# Patient Record
Sex: MALE | Race: WHITE | Employment: OTHER | URBAN - METROPOLITAN AREA
[De-identification: names, ages, dates, MRNs, and addresses within clinical notes are randomized per-mention and may not be internally consistent; named-entity substitution may affect disease eponyms.]

---

## 2017-01-11 DIAGNOSIS — E11.9 TYPE 2 DIABETES MELLITUS WITHOUT COMPLICATION, UNSPECIFIED LONG TERM INSULIN USE STATUS: Primary | ICD-10-CM

## 2017-01-11 NOTE — TELEPHONE ENCOUNTER
glimepiride (AMARYL) 1 MG tablet         Last Written Prescription Date: 10/18/16  Last Fill Quantity: 90, # refills: 0  Last Office Visit with G, P or [facility] prescribing provider:  11/25/16        BP Readings from Last 3 Encounters:   11/25/16 124/73   06/28/16 112/69   06/06/16 113/72     MICROL       32   6/6/2016  No results found for this basename: microalbumin  CREATININE   Date Value Ref Range Status   06/06/2016 1.09 0.66 - 1.25 mg/dL Final   ]  GFR ESTIMATE   Date Value Ref Range Status   06/06/2016 68 >60 mL/min/1.7m2 Final     Comment:     Non  GFR Calc   05/06/2016 71.9 ml/min/1.73m2 Final   08/14/2015 71.9 ml/min/1.73m2 Final     GFR ESTIMATE IF BLACK   Date Value Ref Range Status   06/06/2016 82 >60 mL/min/1.7m2 Final     Comment:      GFR Calc   10/28/2014 83 >60 mL/min/1.7m2 Final     Comment:      GFR Calc   10/26/2009 76 >60 mL/min/1.7m2 Final     CHOL      186   6/6/2016  HDL       43   6/6/2016  LDL       83   6/6/2016  LDL       96   7/2/2013  TRIG      301   6/6/2016  CHOLHDLRATIO      4.0   5/11/2015  AST       15   5/6/2016  ALT       36   5/6/2016  A1C      7.6   11/25/2016  A1C      7.3   6/6/2016  A1C      7.2   11/30/2015  A1C      7.1   5/11/2015  A1C      7.1   10/28/2014  POTASSIUM   Date Value Ref Range Status   06/06/2016 5.4* 3.4 - 5.3 mmol/L Final       [facility]

## 2017-01-12 RX ORDER — GLIMEPIRIDE 1 MG/1
1 TABLET ORAL EVERY MORNING
Qty: 90 TABLET | Refills: 3 | Status: SHIPPED | OUTPATIENT
Start: 2017-01-12 | End: 2017-12-27

## 2017-01-12 NOTE — TELEPHONE ENCOUNTER
Routing refill request to provider for review/approval because:  Drug warning.   Ilene Major RN

## 2017-05-02 ENCOUNTER — TRANSFERRED RECORDS (OUTPATIENT)
Dept: HEALTH INFORMATION MANAGEMENT | Facility: CLINIC | Age: 67
End: 2017-05-02

## 2017-05-02 LAB
ALT SERPL-CCNC: 30 IU/L (ref 5–40)
AST SERPL-CCNC: 11 U/L (ref 5–34)
CREAT SERPL-MCNC: 1.08 MG/DL (ref 0.5–1.3)
GFR SERPL CREATININE-BSD FRML MDRD: 73.2 ML/MIN/1.73M2

## 2017-05-19 DIAGNOSIS — J31.0 CHRONIC RHINITIS: ICD-10-CM

## 2017-05-19 NOTE — TELEPHONE ENCOUNTER
fluticasone (FLONASE) 50 MCG/ACT nasal spray       Last Written Prescription Date: 11/25/16  Last Fill Quantity: 16 ml, # refills: 3    Last Office Visit with G, [facility] or [facility] prescribing provider:  11/25/16   Future Office Visit:       Date of Last Asthma Action Plan Letter:   There are no preventive care reminders to display for this patient.   Asthma Control Test: No flowsheet data found.    Date of Last Spirometry Test:   No results found for this or any previous visit.          Raheem Faarax  [facility]

## 2017-05-22 RX ORDER — FLUTICASONE PROPIONATE 50 MCG
SPRAY, SUSPENSION (ML) NASAL
Qty: 16 ML | Refills: 1 | Status: SHIPPED | OUTPATIENT
Start: 2017-05-22 | End: 2017-08-21

## 2017-05-22 NOTE — TELEPHONE ENCOUNTER
Prescription approved per [facility] Refill Protocol.  Annabella Kauffman RN

## 2017-06-12 ENCOUNTER — OFFICE VISIT (OUTPATIENT)
Dept: FAMILY MEDICINE | Facility: CLINIC | Age: 67
End: 2017-06-12
Payer: COMMERCIAL

## 2017-06-12 VITALS
TEMPERATURE: 98.3 F | DIASTOLIC BLOOD PRESSURE: 71 MMHG | BODY MASS INDEX: 27.57 KG/M2 | HEIGHT: 73 IN | WEIGHT: 208 LBS | HEART RATE: 80 BPM | OXYGEN SATURATION: 97 % | SYSTOLIC BLOOD PRESSURE: 106 MMHG

## 2017-06-12 DIAGNOSIS — I10 ESSENTIAL HYPERTENSION, BENIGN: ICD-10-CM

## 2017-06-12 DIAGNOSIS — I10 HYPERTENSION, BENIGN ESSENTIAL, GOAL BELOW 140/90: ICD-10-CM

## 2017-06-12 DIAGNOSIS — M05.79 RHEUMATOID ARTHRITIS INVOLVING MULTIPLE SITES WITH POSITIVE RHEUMATOID FACTOR (H): ICD-10-CM

## 2017-06-12 DIAGNOSIS — E11.9 TYPE 2 DIABETES MELLITUS WITHOUT COMPLICATION, WITH LONG-TERM CURRENT USE OF INSULIN (H): Primary | ICD-10-CM

## 2017-06-12 DIAGNOSIS — Z79.4 TYPE 2 DIABETES MELLITUS WITHOUT COMPLICATION, WITH LONG-TERM CURRENT USE OF INSULIN (H): Primary | ICD-10-CM

## 2017-06-12 DIAGNOSIS — Z23 NEED FOR PROPHYLACTIC VACCINATION WITH TETANUS-DIPHTHERIA (TD): ICD-10-CM

## 2017-06-12 DIAGNOSIS — E78.5 HYPERLIPIDEMIA LDL GOAL <100: ICD-10-CM

## 2017-06-12 LAB
ALBUMIN SERPL-MCNC: 4.1 G/DL (ref 3.4–5)
ANION GAP SERPL CALCULATED.3IONS-SCNC: 11 MMOL/L (ref 3–14)
BUN SERPL-MCNC: 39 MG/DL (ref 7–30)
CALCIUM SERPL-MCNC: 9.5 MG/DL (ref 8.5–10.1)
CHLORIDE SERPL-SCNC: 105 MMOL/L (ref 94–109)
CHOLEST SERPL-MCNC: 195 MG/DL
CO2 SERPL-SCNC: 20 MMOL/L (ref 20–32)
CREAT SERPL-MCNC: 1.32 MG/DL (ref 0.66–1.25)
CREAT UR-MCNC: 88 MG/DL
ERYTHROCYTE [DISTWIDTH] IN BLOOD BY AUTOMATED COUNT: 14.4 % (ref 10–15)
GFR SERPL CREATININE-BSD FRML MDRD: 54 ML/MIN/1.7M2
GLUCOSE SERPL-MCNC: 131 MG/DL (ref 70–99)
HBA1C MFR BLD: 6 % (ref 4.3–6)
HCT VFR BLD AUTO: 43.5 % (ref 40–53)
HDLC SERPL-MCNC: 49 MG/DL
HGB BLD-MCNC: 15 G/DL (ref 13.3–17.7)
LDLC SERPL CALC-MCNC: 114 MG/DL
MCH RBC QN AUTO: 32.3 PG (ref 26.5–33)
MCHC RBC AUTO-ENTMCNC: 34.5 G/DL (ref 31.5–36.5)
MCV RBC AUTO: 94 FL (ref 78–100)
MICROALBUMIN UR-MCNC: 8 MG/L
MICROALBUMIN/CREAT UR: 8.76 MG/G CR (ref 0–17)
NONHDLC SERPL-MCNC: 146 MG/DL
PHOSPHATE SERPL-MCNC: 3.8 MG/DL (ref 2.5–4.5)
PLATELET # BLD AUTO: 223 10E9/L (ref 150–450)
POTASSIUM SERPL-SCNC: 5.8 MMOL/L (ref 3.4–5.3)
RBC # BLD AUTO: 4.64 10E12/L (ref 4.4–5.9)
SODIUM SERPL-SCNC: 136 MMOL/L (ref 133–144)
TRIGL SERPL-MCNC: 161 MG/DL
WBC # BLD AUTO: 8 10E9/L (ref 4–11)

## 2017-06-12 PROCEDURE — 83036 HEMOGLOBIN GLYCOSYLATED A1C: CPT | Performed by: FAMILY MEDICINE

## 2017-06-12 PROCEDURE — 80061 LIPID PANEL: CPT | Performed by: FAMILY MEDICINE

## 2017-06-12 PROCEDURE — 85027 COMPLETE CBC AUTOMATED: CPT | Performed by: FAMILY MEDICINE

## 2017-06-12 PROCEDURE — 36415 COLL VENOUS BLD VENIPUNCTURE: CPT | Performed by: FAMILY MEDICINE

## 2017-06-12 PROCEDURE — 99214 OFFICE O/P EST MOD 30 MIN: CPT | Mod: 25 | Performed by: FAMILY MEDICINE

## 2017-06-12 PROCEDURE — 90715 TDAP VACCINE 7 YRS/> IM: CPT | Performed by: FAMILY MEDICINE

## 2017-06-12 PROCEDURE — 90471 IMMUNIZATION ADMIN: CPT | Performed by: FAMILY MEDICINE

## 2017-06-12 PROCEDURE — 82043 UR ALBUMIN QUANTITATIVE: CPT | Performed by: FAMILY MEDICINE

## 2017-06-12 PROCEDURE — 80069 RENAL FUNCTION PANEL: CPT | Performed by: FAMILY MEDICINE

## 2017-06-12 RX ORDER — LISINOPRIL 10 MG/1
10 TABLET ORAL DAILY
Qty: 90 TABLET | Refills: 3 | Status: SHIPPED | OUTPATIENT
Start: 2017-06-12

## 2017-06-12 ASSESSMENT — PAIN SCALES - GENERAL: PAINLEVEL: NO PAIN (1)

## 2017-06-12 NOTE — NURSING NOTE
"Chief Complaint   Patient presents with     Diabetes     Fasting       Initial /71 (BP Location: Right arm, Patient Position: Chair, Cuff Size: Adult Regular)  Pulse 80  Temp 98.3  F (36.8  C) (Oral)  Ht 6' 0.5\" (1.842 m)  Wt 208 lb (94.3 kg)  SpO2 97%  BMI 27.82 kg/m2 Estimated body mass index is 27.82 kg/(m^2) as calculated from the following:    Height as of this encounter: 6' 0.5\" (1.842 m).    Weight as of this encounter: 208 lb (94.3 kg).  Medication Reconciliation: complete     Brad Varner CMA    "

## 2017-06-12 NOTE — PROGRESS NOTES
SUBJECTIVE:                                                    Michael Stallings is a 66 year old male who presents to clinic today for the following health issues:      Diabetes Follow-up    Patient is checking blood sugars: twice daily. 111-120, cut way back on his carbohydrates to 50-60 a day and is losing weight. Decreased the insulin also because of low blood sugar.  Blood sugar testing frequency justification: Uncontrolled diabetes  Results are as follows:         am - this morning 111. Range around     Diabetic concerns: None     Symptoms of hypoglycemia (low blood sugar): none     Paresthesias (numbness or burning in feet) or sores: Unsure due to arthritis     Date of last diabetic eye exam: UTD     Hyperlipidemia Follow-Up      Rate your low fat/cholesterol diet?: fair    Taking statin?  No    Other lipid medications/supplements?:  Fish oil/Omega 3, 1 pill  twice a day without side effects     Hypertension Follow-up      Outpatient blood pressures are not being checked.    Low Salt Diet: no added salt         Amount of exercise or physical activity: 6-7 days/week for an average of 45-60 minutes    Problems taking medications regularly: No    Medication side effects: none    Diet: diabetic      Rheumatoid arthritis well controlled.     Problem list and histories reviewed & adjusted, as indicated.  Additional history: as documented    Patient Active Problem List   Diagnosis     Hyperlipidemia LDL goal <100     RA (rheumatoid arthritis) (H)     Advanced directives, counseling/discussion     Seasonal allergic rhinitis     Type 2 diabetes mellitus without complication (H)     Overweight (BMI 25.0-29.9)     Non morbid obesity due to excess calories     Hypertension, benign essential, goal below 140/90     Past Surgical History:   Procedure Laterality Date     ABDOMEN SURGERY  4/2010    small bowel tumor leiomyoma, benign     KNEE SURGERY  19 years of age     TONSILLECTOMY  childhood     VASECTOMY  1991        Social History   Substance Use Topics     Smoking status: Former Smoker     Quit date: 3/21/1990     Smokeless tobacco: Never Used     Alcohol use Yes      Comment: 2 wkly     Family History   Problem Relation Age of Onset     Breast Cancer Mother      CANCER Paternal Grandfather 60     Lung Cancer     DIABETES Daughter          Current Outpatient Prescriptions   Medication Sig Dispense Refill     fluticasone (FLONASE) 50 MCG/ACT spray SHAKE LIQUID AND USE 1 TO 2 SPRAYS IN EACH NOSTRIL DAILY 16 mL 1     glimepiride (AMARYL) 1 MG tablet Take 1 tablet (1 mg) by mouth every morning 90 tablet 3     simvastatin (ZOCOR) 40 MG tablet Take 1 tablet (40 mg) by mouth daily 90 tablet 3     metFORMIN (GLUCOPHAGE) 1000 MG tablet Take 1 tablet (1,000 mg) by mouth 2 times daily (with meals) 180 tablet 3     lisinopril (PRINIVIL,ZESTRIL) 20 MG tablet Take 1 tablet (20 mg) by mouth daily 90 tablet 3     insulin glargine (LANTUS SOLOSTAR) 100 UNIT/ML PEN Inject 50 Units Subcutaneous At Bedtime (Patient taking differently: Inject 40 Units Subcutaneous At Bedtime ) 45 mL 3     BD FATOU U/F 32G X 4 MM insulin pen needle Inject Subcutaneous 2 times daily Use 2 pen needles daily or as directed. 200 each 3     aspirin 81 MG tablet Take 1 tablet (81 mg) by mouth daily 100 tablet 3     albuterol (PROAIR HFA, PROVENTIL HFA, VENTOLIN HFA) 108 (90 BASE) MCG/ACT inhaler Inhale 2 puffs into the lungs every 6 hours as needed for shortness of breath / dyspnea 1 Inhaler 6     blood glucose monitoring (ACCU-CHEK CATE PLUS) test strip 1 strip by In Vitro route 2 times daily Use to test blood sugar 2 times daily or as directed. 200 each 3     BD ULTRA FINE PEN NEEDLES Inject 4 mm as directed daily 4 mm x 32G 100 Device 3     fish oil-omega-3 fatty acids (FISH OIL) 1000 MG capsule Take 1 g by mouth 2 times daily.       methotrexate, Anti-Rheumatic, 2.5 MG TABS Take 8 tablets by mouth once a week.       folic acid (FOLVITE) 1 MG tablet Take 1 mg by  "mouth daily.       Allergies   Allergen Reactions     Penicillin G      Family history     Sulfa Drugs      Recent Labs   Lab Test 05/02/17 11/25/16   1649  06/06/16   1139 05/06/16  11/30/15   0926 08/14/15  05/11/15   0848   10/28/14   1009   05/05/14   0827  11/04/13   0916   A1C   --   7.6*  7.3*   --   7.2*   --   7.1*   --   7.1*   --   6.7*  6.8*   LDL   --    --   83   --    --    --   69   --    --    --   81  113   HDL   --    --   43   --    --    --   41   --    --    --   44  45   TRIG   --    --   301*   --    --    --   268*   --    --    --   234*  156*   ALT  30   --    --   36   --   45*   --    < >   --    < >   --    --    CR  1.080   --   1.09  1.1   --   1.1   --    < >  1.09   < >   --    --    GFRESTIMATED  73.2   --   68  71.9   --   71.9   --    --   68   < >   --    --    GFRESTBLACK   --    --   82   --    --    --    --    --   83   --    --    --    POTASSIUM   --    --   5.4*   --    --    --    --    --   4.8   --    --    --    TSH   --    --   1.46   --    --    --    --    --    --    --    --   1.67    < > = values in this interval not displayed.      BP Readings from Last 3 Encounters:   06/12/17 106/71   11/25/16 124/73   06/28/16 112/69    Wt Readings from Last 3 Encounters:   06/12/17 208 lb (94.3 kg)   11/25/16 219 lb (99.3 kg)   06/28/16 218 lb 9.6 oz (99.2 kg)                  Labs reviewed in EPIC    Reviewed and updated as needed this visit by clinical staff       Reviewed and updated as needed this visit by Provider         ROS:  Constitutional, HEENT, cardiovascular, pulmonary, gi and gu systems are negative, except as otherwise noted.    OBJECTIVE:                                                    /71 (BP Location: Right arm, Patient Position: Chair, Cuff Size: Adult Regular)  Pulse 80  Temp 98.3  F (36.8  C) (Oral)  Ht 6' 0.5\" (1.842 m)  Wt 208 lb (94.3 kg)  SpO2 97%  BMI 27.82 kg/m2  Body mass index is 27.82 kg/(m^2).  GENERAL: healthy, alert and no " "distress, is not obese  EYES: Eyes grossly normal to inspection, PERRL and conjunctivae and sclerae normal  HENT: ear canals and TM's normal, nose and mouth without ulcers or lesions  NECK: no adenopathy, no asymmetry, masses, or scars and thyroid normal to palpation  RESP: lungs clear to auscultation - no rales, rhonchi or wheezes  CV: regular rate and rhythm, normal S1 S2, no S3 or S4, no murmur, click or rub, no peripheral edema and peripheral pulses strong  ABDOMEN: soft, nontender, no hepatosplenomegaly, no masses and bowel sounds normal  MS: no gross musculoskeletal defects noted, no edema  SKIN: no suspicious lesions or rashes  NEURO: Normal strength and tone, mentation intact and speech normal  BACK: no CVA tenderness, no paralumbar tenderness  PSYCH: mentation appears normal, affect normal/bright  Diabetic foot exam: normal DP and PT pulses, no trophic changes or ulcerative lesions, normal calluses, normal sensory exam and normal monofilament exam     Diagnostic Test Results:  Results for orders placed or performed in visit on 06/12/17 (from the past 24 hour(s))   HEMOGLOBIN A1C   Result Value Ref Range    Hemoglobin A1C 6.0 4.3 - 6.0 %   CBC with platelets   Result Value Ref Range    WBC 8.0 4.0 - 11.0 10e9/L    RBC Count 4.64 4.4 - 5.9 10e12/L    Hemoglobin 15.0 13.3 - 17.7 g/dL    Hematocrit 43.5 40.0 - 53.0 %    MCV 94 78 - 100 fl    MCH 32.3 26.5 - 33.0 pg    MCHC 34.5 31.5 - 36.5 g/dL    RDW 14.4 10.0 - 15.0 %    Platelet Count 223 150 - 450 10e9/L        ASSESSMENT/PLAN:                                                        Tobacco Cessation:   reports that he quit smoking about 27 years ago. He has never used smokeless tobacco.      BMI:   Estimated body mass index is 27.82 kg/(m^2) as calculated from the following:    Height as of this encounter: 6' 0.5\" (1.842 m).    Weight as of this encounter: 208 lb (94.3 kg).   Weight management plan: Discussed healthy diet and exercise guidelines and patient " will follow up in 3 months in clinic to re-evaluate.        ICD-10-CM    1. Type 2 diabetes mellitus without complication, with long-term current use of insulin (H)- excellent control with dietary changes E11.9 HEMOGLOBIN A1C    Z79.4 Albumin Random Urine Quantitative     insulin glargine (LANTUS SOLOSTAR) 100 UNIT/ML injection- 40 units a day   2. Hyperlipidemia LDL goal <100 E78.5 Lipid panel reflex to direct LDL   3. Rheumatoid arthritis involving multiple sites with positive rheumatoid factor (H) M05.79 Managed with methotrexate and rheumatology   4. Hypertension, benign essential, goal below 140/90 I10 Renal panel- blood pressure low today and will decrease lisinopril to 10 mg     CBC with platelets   5. Need for prophylactic vaccination with tetanus-diphtheria (TD) Z23      ADMIN VACCINE, FIRST     TDAP VACCINE (ADACEL)   6. Essential hypertension, benign I10 lisinopril (PRINIVIL/ZESTRIL) 10 MG tablet       FUTURE LABS:       - Schedule fasting labs in 6 months  FUTURE APPOINTMENTS:       - Follow-up visit in 6 months or sooner if any questions or concerns.   Work on weight loss  Regular exercise  See Patient Instructions    Janet Can MD  [facility]

## 2017-06-12 NOTE — MR AVS SNAPSHOT
After Visit Summary   6/12/2017    Michael Stallings    MRN: [id number]           Patient Information     Date Of Birth          1950        Visit Information        Provider Department      6/12/2017 9:00 AM Janet Can MD [facility]        Today's Diagnoses     Type 2 diabetes mellitus without complication, with long-term current use of insulin (H)    -  1    Hyperlipidemia LDL goal <100        Rheumatoid arthritis involving multiple sites with positive rheumatoid factor (H)        Hypertension, benign essential, goal below 140/90        Need for prophylactic vaccination with tetanus-diphtheria (TD)        Essential hypertension, benign          Care Instructions    How to contact your care team: [phone] Pharmacy [phone]   RAZ ANTON MD KATYA GEORGIEV, PA-C CHRIS JONES, PA-C NAM HO, MD JONATHAN BATES, MD ARVIN VOCAL, MD    Clinic hours M-Th 7am-7pm Fri 7am-5pm.   Urgent care M-F 11am-9pm  Sat/Sun 9am-5pm.   Pharmacy   Mon-Th:  8:00am-8pm   Fri:  8:00am-6:00pm  Sat/Sun  8:00am-5:00 pm               Follow-ups after your visit        Follow-up notes from your care team     Return in about 3 months (around 9/12/2017) for Physical Exam, Lab Work, medication follow up.      Who to contact     If you have questions or need follow up information about today's clinic visit or your schedule please contact [facility] directly at [phone].  Normal or non-critical lab and imaging results will be communicated to you by MyChart, letter or phone within 4 business days after the clinic has received the results. If you do not hear from us within 7 days, please contact the clinic through MyChart or phone. If you have a critical or abnormal lab result, we will notify you by phone as soon as possible.  Submit refill requests through [employer] or call your pharmacy and they will forward the refill request to us. Please  "allow 3 business days for your refill to be completed.          Additional Information About Your Visit        MyChart Information     [employer] gives you secure access to your electronic health record. If you see a primary care provider, you can also send messages to your care team and make appointments. If you have questions, please call your primary care clinic.  If you do not have a primary care provider, please call [phone] and they will assist you.        Care EveryWhere ID     This is your Care EveryWhere ID. This could be used by other organizations to access your [facility] medical records  [id number]        Your Vitals Were     Pulse Temperature Height Pulse Oximetry BMI (Body Mass Index)       80 98.3  F (36.8  C) (Oral) 6' 0.5\" (1.842 m) 97% 27.82 kg/m2        Blood Pressure from Last 3 Encounters:   06/12/17 106/71   11/25/16 124/73   06/28/16 112/69    Weight from Last 3 Encounters:   06/12/17 208 lb (94.3 kg)   11/25/16 219 lb (99.3 kg)   06/28/16 218 lb 9.6 oz (99.2 kg)              We Performed the Following          ADMIN VACCINE, FIRST     Albumin Random Urine Quantitative     CBC with platelets     HEMOGLOBIN A1C     Lipid panel reflex to direct LDL     Renal panel     TDAP VACCINE (ADACEL)          Today's Medication Changes          These changes are accurate as of: 6/12/17  2:52 PM.  If you have any questions, ask your nurse or doctor.               These medicines have changed or have updated prescriptions.        Dose/Directions    lisinopril 10 MG tablet   Commonly known as:  PRINIVIL/ZESTRIL   This may have changed:    - medication strength  - how much to take   Used for:  Essential hypertension, benign   Changed by:  Janet Can MD        Dose:  10 mg   Take 1 tablet (10 mg) by mouth daily   Quantity:  90 tablet   Refills:  3            Where to get your medicines      These medications were sent to [facility] [address] [place] [address] - [address] AT [facility] of " [address] [place] [address]     Phone:  [phone]     insulin glargine 100 UNIT/ML injection    lisinopril 10 MG tablet                Primary Care Provider Office Phone # Fax #    Janet Dottie Can [phone]       [facility] [address]  [facility] [address]        Thank you!     Thank you for choosing [facility]  for your care. Our goal is always to provide you with excellent care. Hearing back from our patients is one way we can continue to improve our services. Please take a few minutes to complete the written survey that you may receive in the mail after your visit with us. Thank you!             Your Updated Medication List - Protect others around you: Learn how to safely use, store and throw away your medicines at www.disposemymeds.org.          This list is accurate as of: 6/12/17  2:52 PM.  Always use your most recent med list.                   Brand Name Dispense Instructions for use    albuterol 108 (90 BASE) MCG/ACT Inhaler    PROAIR HFA/PROVENTIL HFA/VENTOLIN HFA    1 Inhaler    Inhale 2 puffs into the lungs every 6 hours as needed for shortness of breath / dyspnea       aspirin 81 MG tablet     100 tablet    Take 1 tablet (81 mg) by mouth daily       BD FATOU U/F 32G X 4 MM   Generic drug:  insulin pen needle     200 each    Inject Subcutaneous 2 times daily Use 2 pen needles daily or as directed.       BD ULTRA FINE PEN NEEDLES     100 Device    Inject 4 mm as directed daily 4 mm x 32G       blood glucose monitoring test strip    ACCU-CHEK CATE PLUS    200 each    1 strip by In Vitro route 2 times daily Use to test blood sugar 2 times daily or as directed.       fish oil-omega-3 fatty acids 1000 MG capsule      Take 1 g by mouth 2 times daily.       fluticasone 50 MCG/ACT spray    FLONASE    16 mL    SHAKE LIQUID AND USE 1 TO 2 SPRAYS IN EACH NOSTRIL DAILY       folic acid 1 MG tablet    FOLVITE      Take 1 mg by mouth daily.       glimepiride 1 MG tablet    AMARYL    90 tablet    Take 1 tablet (1 mg) by mouth every morning       insulin glargine 100 UNIT/ML injection    LANTUS SOLOSTAR    36 mL    Inject 40 Units Subcutaneous At Bedtime       lisinopril 10 MG tablet    PRINIVIL/ZESTRIL    90 tablet    Take 1 tablet (10 mg) by mouth daily       metFORMIN 1000 MG tablet    GLUCOPHAGE    180 tablet    Take 1 tablet (1,000 mg) by mouth 2 times daily (with meals)       methotrexate (Anti-Rheumatic) 2.5 MG Tabs      Take 8 tablets by mouth once a week.       simvastatin 40 MG tablet    ZOCOR    90 tablet    Take 1 tablet (40 mg) by mouth daily

## 2017-06-12 NOTE — NURSING NOTE
Screening Questionnaire for Adult Immunization    Are you sick today?   No   Do you have allergies to medications, food, a vaccine component or latex?   No   Have you ever had a serious reaction after receiving a vaccination?   No   Do you have a long-term health problem with heart disease, lung disease, asthma, kidney disease, metabolic disease (e.g. diabetes), anemia, or other blood disorder?   No   Do you have cancer, leukemia, HIV/AIDS, or any other immune system problem?   No   In the past 3 months, have you taken medications that affect  your immune system, such as prednisone, other steroids, or anticancer drugs; drugs for the treatment of rheumatoid arthritis, Crohn s disease, or psoriasis; or have you had radiation treatments?   No   Have you had a seizure, or a brain or other nervous system problem?   No   During the past year, have you received a transfusion of blood or blood     products, or been given immune (gamma) globulin or antiviral drug?   No   For women: Are you pregnant or is there a chance you could become        pregnant during the next month?   No   Have you received any vaccinations in the past 4 weeks?   No     Immunization questionnaire answers were all negative.      MNVFC doesn't apply on this patient     Screening performed by Arvind Salinas on 6/12/2017 at 10:15 AM.

## 2017-06-12 NOTE — PATIENT INSTRUCTIONS
How to contact your care team: [phone] Pharmacy [phone]   RAZ ANTON MD KATYA GEORGIEV, PA-C CHRIS JONES, PA-C NAM HO, MD JONATHAN BATES, MD ARVIN VOCAL, MD    Clinic hours M-Th 7am-7pm Fri 7am-5pm.   Urgent care M-F 11am-9pm  Sat/Sun 9am-5pm.   Pharmacy   Mon-Th:  8:00am-8pm   Fri:  8:00am-6:00pm  Sat/Sun  8:00am-5:00 pm

## 2017-06-14 NOTE — PROGRESS NOTES
Dear Michael    Your test results are attached.     The kidney function is lower and this is probably from some dehydration and too much losartan. This should improve by lowering the dose to 10 mg. You should also drink enough water to keep from getting thirsty. Bring water with you at all times, especially when it's hot.     The diabetes test looks great! Cutting back on the insulin as needed is appropriate to avoid low blood sugars.     Your LDL is a little higher at 114 and we like to see it under 100 in diabetes, so be sure to take this every day.     I would like to recheck the kidney tests in 1-2 months to make sure they are improving. I will put in a future lab order for this. You can schedule a lab only appointment and do not need to fast for it.     Please contact me by numberFiret if you have any questions about your labs or management.    Janet Can MD

## 2017-08-08 ENCOUNTER — TRANSFERRED RECORDS (OUTPATIENT)
Dept: HEALTH INFORMATION MANAGEMENT | Facility: CLINIC | Age: 67
End: 2017-08-08

## 2017-08-08 LAB
ALT SERPL-CCNC: 29 IU/L (ref 5–40)
AST SERPL-CCNC: 15 U/L (ref 5–34)
CREAT SERPL-MCNC: 1.04 MG/DL (ref 0.5–1.3)
GFR SERPL CREATININE-BSD FRML MDRD: 76.2 ML/MIN/1.73M2

## 2017-08-11 ENCOUNTER — TRANSFERRED RECORDS (OUTPATIENT)
Dept: HEALTH INFORMATION MANAGEMENT | Facility: CLINIC | Age: 67
End: 2017-08-11

## 2017-08-21 DIAGNOSIS — J31.0 CHRONIC RHINITIS, UNSPECIFIED TYPE: Primary | ICD-10-CM

## 2017-08-21 DIAGNOSIS — J31.0 CHRONIC RHINITIS: ICD-10-CM

## 2017-08-21 NOTE — TELEPHONE ENCOUNTER
fluticasone (FLONASE) 50 MCG/ACT spray      Last Written Prescription Date: 5/22/17  Last Fill Quantity: 16,  # refills: 1   Last Office Visit with [facility], [facility] or [facility] prescribing provider: 6/12/17      Jannette Waters  [facility]

## 2017-08-22 RX ORDER — FLUTICASONE PROPIONATE 50 MCG
SPRAY, SUSPENSION (ML) NASAL
Qty: 16 ML | Refills: 1 | Status: SHIPPED | OUTPATIENT
Start: 2017-08-22 | End: 2017-09-14

## 2017-09-14 ENCOUNTER — OFFICE VISIT (OUTPATIENT)
Dept: FAMILY MEDICINE | Facility: CLINIC | Age: 67
End: 2017-09-14
Payer: COMMERCIAL

## 2017-09-14 VITALS
SYSTOLIC BLOOD PRESSURE: 117 MMHG | HEART RATE: 75 BPM | HEIGHT: 73 IN | DIASTOLIC BLOOD PRESSURE: 77 MMHG | TEMPERATURE: 97.8 F | OXYGEN SATURATION: 94 % | WEIGHT: 212 LBS | BODY MASS INDEX: 28.1 KG/M2

## 2017-09-14 DIAGNOSIS — E66.09 NON MORBID OBESITY DUE TO EXCESS CALORIES: ICD-10-CM

## 2017-09-14 DIAGNOSIS — Z00.01 ENCOUNTER FOR ROUTINE ADULT PHYSICAL EXAM WITH ABNORMAL FINDINGS: Primary | ICD-10-CM

## 2017-09-14 DIAGNOSIS — E11.9 TYPE 2 DIABETES MELLITUS WITHOUT COMPLICATION, WITH LONG-TERM CURRENT USE OF INSULIN (H): ICD-10-CM

## 2017-09-14 DIAGNOSIS — E78.5 HYPERLIPIDEMIA LDL GOAL <100: ICD-10-CM

## 2017-09-14 DIAGNOSIS — R30.0 DYSURIA: ICD-10-CM

## 2017-09-14 DIAGNOSIS — Z23 NEED FOR PROPHYLACTIC VACCINATION AND INOCULATION AGAINST INFLUENZA: ICD-10-CM

## 2017-09-14 DIAGNOSIS — M05.79 RHEUMATOID ARTHRITIS INVOLVING MULTIPLE SITES WITH POSITIVE RHEUMATOID FACTOR (H): ICD-10-CM

## 2017-09-14 DIAGNOSIS — Z79.4 TYPE 2 DIABETES MELLITUS WITHOUT COMPLICATION, WITH LONG-TERM CURRENT USE OF INSULIN (H): ICD-10-CM

## 2017-09-14 DIAGNOSIS — Z23 NEED FOR PROPHYLACTIC VACCINATION AGAINST STREPTOCOCCUS PNEUMONIAE (PNEUMOCOCCUS): ICD-10-CM

## 2017-09-14 DIAGNOSIS — I10 HYPERTENSION, BENIGN ESSENTIAL, GOAL BELOW 140/90: ICD-10-CM

## 2017-09-14 DIAGNOSIS — N40.1 BENIGN PROSTATIC HYPERPLASIA WITH WEAK URINARY STREAM: ICD-10-CM

## 2017-09-14 DIAGNOSIS — R39.12 BENIGN PROSTATIC HYPERPLASIA WITH WEAK URINARY STREAM: ICD-10-CM

## 2017-09-14 DIAGNOSIS — E87.5 HYPERKALEMIA: ICD-10-CM

## 2017-09-14 LAB
GLUCOSE SERPL-MCNC: 178 MG/DL (ref 70–99)
HBA1C MFR BLD: 7 % (ref 4.3–6)
POTASSIUM SERPL-SCNC: 4.4 MMOL/L (ref 3.4–5.3)

## 2017-09-14 PROCEDURE — 84132 ASSAY OF SERUM POTASSIUM: CPT | Performed by: FAMILY MEDICINE

## 2017-09-14 PROCEDURE — 36415 COLL VENOUS BLD VENIPUNCTURE: CPT | Performed by: FAMILY MEDICINE

## 2017-09-14 PROCEDURE — 83036 HEMOGLOBIN GLYCOSYLATED A1C: CPT | Performed by: FAMILY MEDICINE

## 2017-09-14 PROCEDURE — 90670 PCV13 VACCINE IM: CPT | Performed by: FAMILY MEDICINE

## 2017-09-14 PROCEDURE — 90471 IMMUNIZATION ADMIN: CPT | Performed by: FAMILY MEDICINE

## 2017-09-14 PROCEDURE — 99397 PER PM REEVAL EST PAT 65+ YR: CPT | Mod: 25 | Performed by: FAMILY MEDICINE

## 2017-09-14 PROCEDURE — 82947 ASSAY GLUCOSE BLOOD QUANT: CPT | Performed by: FAMILY MEDICINE

## 2017-09-14 PROCEDURE — 90472 IMMUNIZATION ADMIN EACH ADD: CPT | Performed by: FAMILY MEDICINE

## 2017-09-14 PROCEDURE — 90662 IIV NO PRSV INCREASED AG IM: CPT | Performed by: FAMILY MEDICINE

## 2017-09-14 ASSESSMENT — PAIN SCALES - GENERAL: PAINLEVEL: NO PAIN (0)

## 2017-09-14 NOTE — MR AVS SNAPSHOT
After Visit Summary   9/14/2017    Michael Stallings    MRN: [id number]           Patient Information     Date Of Birth          1950        Visit Information        Provider Department      9/14/2017 7:00 AM Janet Can MD [facility]        Today's Diagnoses     Encounter for routine adult physical exam with abnormal findings    -  1    Type 2 diabetes mellitus without complication, with long-term current use of insulin (H)        Rheumatoid arthritis involving multiple sites with positive rheumatoid factor (H)        Need for prophylactic vaccination and inoculation against influenza        Need for prophylactic vaccination against Streptococcus pneumoniae (pneumococcus)        Dysuria        Benign prostatic hyperplasia with weak urinary stream        Hyperkalemia        Hyperlipidemia LDL goal <100        Hypertension, benign essential, goal below 140/90        Non morbid obesity due to excess calories          Care Instructions    How to contact your care team: [phone] Pharmacy [phone]   MD ALTAGRACIA LEONARD PATIFFANY FLORES PAMD MICHELLE MARX MD ARVIN VOCAL, MD    Clinic hours M-Th 7am-7pm Fri 7am-5pm.   Urgent care M-F 11am-9pm  Sat/Sun 9am-5pm.   Pharmacy   Mon-Th:  8:00am-8pm   Fri:  8:00am-6:00pm  Sat/Sun  8:00am-5:00 pm       Preventive Health Recommendations:   Male Ages 65 and over    Yearly exam:             See your health care provider every year in order to  o   Review health changes.   o   Discuss preventive care.    o   Review your medicines if your doctor has prescribed any.    Talk with your health care provider about whether you should have a test to screen for prostate cancer (PSA).    Every 3 years, have a diabetes test (fasting glucose). If you are at risk for diabetes, you should have this test more often.    Every 5 years, have a cholesterol test. Have this test more often if you are at risk for  high cholesterol or heart disease.     Every 10 years, have a colonoscopy. Or, have a yearly FIT test (stool test). These exams will check for colon cancer.    Talk to with your health care provider about screening for Abdominal Aortic Aneurysm if you have a family history of AAA or have a history of smoking.    Shots:     Get a flu shot each year.     Get a tetanus shot every 10 years.     Talk to your doctor about your pneumonia vaccines. There are now two you should receive - Pneumovax (PPSV 23) and Prevnar (PCV 13).     Talk to your doctor about a shingles vaccine.     Talk to your doctor about the hepatitis B vaccine.  Nutrition:     Eat at least 5 servings of fruits and vegetables each day.     Eat whole-grain bread, whole-wheat pasta and brown rice instead of white grains and rice.     Talk to your provider about Calcium and Vitamin D.   Lifestyle    Exercise for at least 150 minutes a week (30 minutes a day, 5 days a week). This will help you control your weight and prevent disease.     Limit alcohol to one drink per day.     No smoking.     Wear sunscreen to prevent skin cancer.     See your dentist every six months for an exam and cleaning.     See your eye doctor every 1 to 2 years to screen for conditions such as glaucoma, macular degeneration, cataracts, etc           Follow-ups after your visit        Additional Services     UROLOGY ADULT REFERRAL       Your provider has referred you to: [facility]: [facility] [phone]   [email or web address]    Please be aware that coverage of these services is subject to the terms and limitations of your health insurance plan.  Call member services at your health plan with any benefit or coverage questions.      Please bring the following with you to your appointment:    (1) Any X-Rays, CTs or MRIs which have been performed.  Contact the facility where they were done to arrange for  prior to your scheduled appointment.  "   (2) List of current medications  (3) This referral request   (4) Any documents/labs given to you for this referral                  Follow-up notes from your care team     Return in about 6 months (around 3/14/2018) for Lab Work, Routine Visit, medication follow up.      Who to contact     If you have questions or need follow up information about today's clinic visit or your schedule please contact [facility] directly at [phone].  Normal or non-critical lab and imaging results will be communicated to you by [employer], letter or phone within 4 business days after the clinic has received the results. If you do not hear from us within 7 days, please contact the clinic through [employer] or phone. If you have a critical or abnormal lab result, we will notify you by phone as soon as possible.  Submit refill requests through [employer] or call your pharmacy and they will forward the refill request to us. Please allow 3 business days for your refill to be completed.          Additional Information About Your Visit        [employer] Information     [employer] gives you secure access to your electronic health record. If you see a primary care provider, you can also send messages to your care team and make appointments. If you have questions, please call your primary care clinic.  If you do not have a primary care provider, please call [phone] and they will assist you.        Care EveryWhere ID     This is your Care EveryWhere ID. This could be used by other organizations to access your [facility] medical records  [id number]        Your Vitals Were     Pulse Temperature Height Pulse Oximetry BMI (Body Mass Index)       75 97.8  F (36.6  C) (Oral) 6' 0.5\" (1.842 m) 94% 28.36 kg/m2        Blood Pressure from Last 3 Encounters:   09/14/17 117/77   06/12/17 106/71   11/25/16 124/73    Weight from Last 3 Encounters:   09/14/17 212 lb (96.2 kg)   06/12/17 208 lb (94.3 kg)   11/25/16 219 lb (99.3 kg)            "   We Performed the Following          ADMIN VACCINE, ADDL [77144]          ADMIN VACCINE, FIRST [25930]     FLU VACCINE, INCREASED ANTIGEN, PRESV FREE, AGE 65+ [41684]     Glucose     Hemoglobin A1c     Pneumococcal vaccine 13 valent PCV13 IM (Prevnar) [24265]     Potassium     UROLOGY ADULT REFERRAL          Today's Medication Changes          These changes are accurate as of: 9/14/17 11:30 AM.  If you have any questions, ask your nurse or doctor.               Stop taking these medicines if you haven't already. Please contact your care team if you have questions.     BD ULTRA FINE PEN NEEDLES   Stopped by:  Janet Can MD                    Primary Care Provider Office Phone # Fax #    Janet Can [phone]       [address] [place]  [facility] [address]        Equal Access to Services     [facility]: Hadii ena ku hadasho Soomaali, waaxda luqadaha, qaybta kaalmada adeegyada, eliot colvin . So [facility] [phone].    ATENCIÓN: Si habla español, tiene a muhammad disposición servicios gratuitos de asistencia lingüística. Jaden al [phone].    We comply with applicable federal civil rights laws and [place] laws. We do not discriminate on the basis of race, color, national origin, age, disability sex, sexual orientation or gender identity.            Thank you!     Thank you for choosing [facility]  for your care. Our goal is always to provide you with excellent care. Hearing back from our patients is one way we can continue to improve our services. Please take a few minutes to complete the written survey that you may receive in the mail after your visit with us. Thank you!             Your Updated Medication List - Protect others around you: Learn how to safely use, store and throw away your medicines at www.disposemymeds.org.          This list is accurate as of: 9/14/17 11:30 AM.  Always use your most recent med list.                    Brand Name Dispense Instructions for use Diagnosis    albuterol 108 (90 BASE) MCG/ACT Inhaler    PROAIR HFA/PROVENTIL HFA/VENTOLIN HFA    1 Inhaler    Inhale 2 puffs into the lungs every 6 hours as needed for shortness of breath / dyspnea    Cough       aspirin 81 MG tablet     100 tablet    Take 1 tablet (81 mg) by mouth daily    Type 2 diabetes mellitus without complication, with long-term current use of insulin (H)       BD FATOU U/F 32G X 4 MM   Generic drug:  insulin pen needle     200 each    Inject Subcutaneous 2 times daily Use 2 pen needles daily or as directed.    Type 2 diabetes mellitus without complication, with long-term current use of insulin (H)       blood glucose monitoring test strip    ACCU-CHEK CATE PLUS    200 each    1 strip by In Vitro route 2 times daily Use to test blood sugar 2 times daily or as directed.    Type 2 diabetes mellitus without complication, with long-term current use of insulin (H)       fish oil-omega-3 fatty acids 1000 MG capsule      Take 1 g by mouth 2 times daily.        fluticasone 50 MCG/ACT spray    FLONASE    16 mL    SHAKE LIQUID AND USE 1 TO 2 SPRAYS IN EACH NOSTRIL DAILY    Chronic rhinitis, unspecified type       folic acid 1 MG tablet    FOLVITE     Take 1 mg by mouth daily.        glimepiride 1 MG tablet    AMARYL    90 tablet    Take 1 tablet (1 mg) by mouth every morning    Type 2 diabetes mellitus without complication, unspecified long term insulin use status (H)       insulin glargine 100 UNIT/ML injection    LANTUS SOLOSTAR    36 mL    Inject 40 Units Subcutaneous At Bedtime    Type 2 diabetes mellitus without complication, with long-term current use of insulin (H)       lisinopril 10 MG tablet    PRINIVIL/ZESTRIL    90 tablet    Take 1 tablet (10 mg) by mouth daily    Essential hypertension, benign       metFORMIN 1000 MG tablet    GLUCOPHAGE    180 tablet    Take 1 tablet (1,000 mg) by mouth 2 times daily (with meals)    Type 2 diabetes mellitus  without complication, with long-term current use of insulin (H)       methotrexate (Anti-Rheumatic) 2.5 MG Tabs      Take 8 tablets by mouth once a week.        simvastatin 40 MG tablet    ZOCOR    90 tablet    Take 1 tablet (40 mg) by mouth daily    Hyperlipidemia, unspecified hyperlipidemia type

## 2017-09-14 NOTE — PROGRESS NOTES
SUBJECTIVE:   Michael Stallings is a 67 year old male who presents for Preventive Visit.  Are you in the first 12 months of your Medicare Part B coverage?  Yes but recently just had eye exam earlier last month and was prefer to pass at this visit since exam was normal at eye clinic.     Answers for HPI/ROS submitted by the patient on 9/11/2017   Annual Exam:  Getting at least 3 servings of Calcium per day:: Yes  Bi-annual eye exam:: Yes  Dental care twice a year:: Yes  Sleep apnea or symptoms of sleep apnea:: None  Diet:: Carbohydrate counting  Frequency of exercise:: 2-3 days/week  Taking medications regularly:: Yes  Medication side effects:: Other  Additional concerns today:: YES  PHQ-2 Score: 0  Duration of exercise:: 30-45 minutes      COGNITIVE SCREEN  1) Repeat 3 items (Banana, Sunrise, Chair)    2) Clock draw: NORMAL  3) 3 item recall: Recalls 3 objects  Results: 3 items recalled: COGNITIVE IMPAIRMENT LESS LIKELY    Mini-CogTM Copyright RENAE Mooney. Licensed by the author for use in [employer]; reprinted with permission ([email or web address]). All rights reserved.                Diabetes Follow-up      Patient is checking blood sugars: 110-144    Diabetic concerns: None     Symptoms of hypoglycemia (low blood sugar): none     Paresthesias (numbness or burning in feet) or sores: No     Date of last diabetic eye exam: up to date     Hyperlipidemia Follow-Up      Rate your low fat/cholesterol diet?: good    Taking statin?  Yes, no muscle aches from statin    Other lipid medications/supplements?:  none      Rheumatoid arthritis on methotrexate and followed by rheumatologist. No recent flare ups. Notices that his blood sugar is higher by 20 points the day after he takes his weekly methotrexate.       Reviewed and updated as needed this visit by clinical staffTobacco  Allergies  Meds         Reviewed and updated as needed this visit by Provider        Social History   Substance Use Topics     Smoking status:  Former Smoker     Quit date: 3/21/1990     Smokeless tobacco: Never Used     Alcohol use Yes      Comment: 2 wkly       The patient does not drink >3 drinks per day nor >7 drinks per week.    Today's PHQ-2 Score:   PHQ-2 ( 1999 Pfizer) 9/11/2017 6/12/2017   Q1: Little interest or pleasure in doing things 0 0   Q2: Feeling down, depressed or hopeless 0 0   PHQ-2 Score 0 0   Q1: Little interest or pleasure in doing things Not at all -   Q2: Feeling down, depressed or hopeless Not at all -   PHQ-2 Score 0 -         Do you feel safe in your environment - Yes    Do you have a Health Care Directive?: YES - on file      Current providers sharing in care for this patient include: Patient Care Team:  Janet Can MD as PCP - General (Family Practice)      Hearing impairment: No    Ability to successfully perform activities of daily living: Yes, no assistance needed     Fall risk:  Fallen 2 or more times in the past year?: No  Any fall with injury in the past year?: No      Home safety:  none identified      The following health maintenance items are reviewed in Epic and correct as of today:Health Maintenance   Topic Date Due     FALL RISK ASSESSMENT  07/12/2017     PNEUMOCOCCAL (1 of 2 - PCV13) 07/12/2017     AORTIC ANEURYSM SCREENING (SYSTEM ASSIGNED)  07/12/2017     INFLUENZA VACCINE (SYSTEM ASSIGNED)  09/01/2017     FOOT EXAM Q1 YEAR  11/25/2017     A1C Q6 MO  12/12/2017     PNEUMO 5YR BOOST DUE AFTER AGE 65 (NO IB MSG) (2) 03/21/2018     TSH W/ FREE T4 REFLEX Q2 YEAR  06/06/2018     LIPID MONITORING Q1 YEAR  06/12/2018     MICROALBUMIN Q1 YEAR  06/12/2018     COLON CANCER SCREEN (SYSTEM ASSIGNED)  07/08/2018     CREATININE Q1 YEAR  08/08/2018     EYE EXAM Q1 YEAR  08/11/2018     ADVANCE DIRECTIVE PLANNING Q5 YRS  10/28/2019     TETANUS IMMUNIZATION (SYSTEM ASSIGNED)  06/12/2027     HEPATITIS C SCREENING  Completed     Labs reviewed in EPIC  BP Readings from Last 3 Encounters:   09/14/17 117/77   06/12/17 106/71    11/25/16 124/73    Wt Readings from Last 3 Encounters:   09/14/17 212 lb (96.2 kg)   06/12/17 208 lb (94.3 kg)   11/25/16 219 lb (99.3 kg)                  Patient Active Problem List   Diagnosis     Hyperlipidemia LDL goal <100     RA (rheumatoid arthritis) (H)     Advanced directives, counseling/discussion     Seasonal allergic rhinitis     Type 2 diabetes mellitus without complication (H)     Overweight (BMI 25.0-29.9)     Non morbid obesity due to excess calories     Hypertension, benign essential, goal below 140/90     Past Surgical History:   Procedure Laterality Date     ABDOMEN SURGERY  4/2010    small bowel tumor leiomyoma, benign     KNEE SURGERY  19 years of age     TONSILLECTOMY  childhood     VASECTOMY  1991       Social History   Substance Use Topics     Smoking status: Former Smoker     Quit date: 3/21/1990     Smokeless tobacco: Never Used     Alcohol use Yes      Comment: 2 wkly     Family History   Problem Relation Age of Onset     Breast Cancer Mother      CANCER Paternal Grandfather 60     Lung Cancer     DIABETES Daughter          Current Outpatient Prescriptions   Medication Sig Dispense Refill     fluticasone (FLONASE) 50 MCG/ACT spray SHAKE LIQUID AND USE 1 TO 2 SPRAYS IN EACH NOSTRIL DAILY 16 mL 1     lisinopril (PRINIVIL/ZESTRIL) 10 MG tablet Take 1 tablet (10 mg) by mouth daily 90 tablet 3     insulin glargine (LANTUS SOLOSTAR) 100 UNIT/ML injection Inject 40 Units Subcutaneous At Bedtime 36 mL 3     glimepiride (AMARYL) 1 MG tablet Take 1 tablet (1 mg) by mouth every morning 90 tablet 3     simvastatin (ZOCOR) 40 MG tablet Take 1 tablet (40 mg) by mouth daily 90 tablet 3     metFORMIN (GLUCOPHAGE) 1000 MG tablet Take 1 tablet (1,000 mg) by mouth 2 times daily (with meals) 180 tablet 3     BD FATOU U/F 32G X 4 MM insulin pen needle Inject Subcutaneous 2 times daily Use 2 pen needles daily or as directed. 200 each 3     aspirin 81 MG tablet Take 1 tablet (81 mg) by mouth daily 100 tablet 3      albuterol (PROAIR HFA, PROVENTIL HFA, VENTOLIN HFA) 108 (90 BASE) MCG/ACT inhaler Inhale 2 puffs into the lungs every 6 hours as needed for shortness of breath / dyspnea 1 Inhaler 6     blood glucose monitoring (ACCU-CHEK CATE PLUS) test strip 1 strip by In Vitro route 2 times daily Use to test blood sugar 2 times daily or as directed. 200 each 3     fish oil-omega-3 fatty acids (FISH OIL) 1000 MG capsule Take 1 g by mouth 2 times daily.       methotrexate, Anti-Rheumatic, 2.5 MG TABS Take 8 tablets by mouth once a week.       folic acid (FOLVITE) 1 MG tablet Take 1 mg by mouth daily.       Allergies   Allergen Reactions     Penicillin G      Family history     Sulfa Drugs      Recent Labs   Lab Test 08/08/17 06/12/17   1005 05/02/17 11/25/16   1649  06/06/16   1139 05/06/16   05/11/15   0848   11/04/13   0916   A1C   --   6.0   --   7.6*  7.3*   --    < >  7.1*   < >  6.8*   LDL   --   114*   --    --   83   --    --   69   < >  113   HDL   --   49   --    --   43   --    --   41   < >  45   TRIG   --   161*   --    --   301*   --    --   268*   < >  156*   ALT  29   --   30   --    --   36   < >   --    < >   --    CR  1.040  1.32*  1.080   --   1.09  1.1   < >   --    < >   --    GFRESTIMATED  76.2  54*  73.2   --   68  71.9   < >   --    < >   --    GFRESTBLACK   --   66   --    --   82   --    --    --    < >   --    POTASSIUM   --   5.8*   --    --   5.4*   --    --    --    < >   --    TSH   --    --    --    --   1.46   --    --    --    --   1.67    < > = values in this interval not displayed.              Pneumonia Vaccine:Adults age 65+ who received Pneumovax (PPSV23) at 65 years or older: Should be given PCV13 > 1 year after their most recent PPSV23    ROS:  Constitutional, HEENT, cardiovascular, pulmonary, gi and gu systems are negative, except as otherwise noted.      OBJECTIVE:   /77 (BP Location: Right arm, Patient Position: Chair, Cuff Size: Adult Regular)  Pulse 75  Temp 97.8  F  "(36.6  C) (Oral)  Ht 6' 0.5\" (1.842 m)  Wt 212 lb (96.2 kg)  SpO2 94%  BMI 28.36 kg/m2 Estimated body mass index is 28.36 kg/(m^2) as calculated from the following:    Height as of this encounter: 6' 0.5\" (1.842 m).    Weight as of this encounter: 212 lb (96.2 kg).  EXAM:   GENERAL: healthy, alert and no distress, is not obese  EYES: Eyes grossly normal to inspection, PERRL and conjunctivae and sclerae normal  HENT: ear canals and TM's normal, nose and mouth without ulcers or lesions  NECK: no adenopathy, no asymmetry, masses, or scars and thyroid normal to palpation  RESP: lungs clear to auscultation - no rales, rhonchi or wheezes  CV: regular rate and rhythm, normal S1 S2, no S3 or S4, no murmur, click or rub, no peripheral edema and peripheral pulses strong  ABDOMEN: soft, nontender, no hepatosplenomegaly, no masses and bowel sounds normal  MS: no gross musculoskeletal defects noted, no edema  SKIN: no suspicious lesions or rashes  NEURO: Normal strength and tone, mentation intact and speech normal  BACK: no CVA tenderness, no paralumbar tenderness  PSYCH: mentation appears normal, affect normal/bright  Diabetic foot exam: normal DP and PT pulses, no trophic changes or ulcerative lesions, normal calluses, normal sensory exam and normal monofilament exam     ASSESSMENT / PLAN:       ICD-10-CM    1. Encounter for routine adult physical exam with abnormal findings Z00.01 Diabetes, rheumatoid arthritis and some dysuria with chronic hesitancy.    2. Type 2 diabetes mellitus without complication, with long-term current use of insulin (H) E11.9 Glucose    Z79.4 Hemoglobin A1c  Lab Results   Component Value Date    A1C 7.0 09/14/2017    A1C 6.0 06/12/2017    A1C 7.6 11/25/2016    A1C 7.3 06/06/2016    A1C 7.2 11/30/2015      well controlled on medications    3. Rheumatoid arthritis involving multiple sites with positive rheumatoid factor (H) M05.79 Stable with no recent change in medication or status   4. Need for " "prophylactic vaccination and inoculation against influenza Z23      ADMIN VACCINE, FIRST [12128]     FLU VACCINE, INCREASED ANTIGEN, PRESV FREE, AGE 65+ [64582]   5. Need for prophylactic vaccination against Streptococcus pneumoniae (pneumococcus) Z23      ADMIN VACCINE, ADDL [15419]     Pneumococcal vaccine 13 valent PCV13 IM (Prevnar) [72313]   6. Dysuria R30.0 UROLOGY ADULT REFERRAL- feels like a muscle cramp or ache after urination.    7. Benign prostatic hyperplasia with weak urinary stream N40.1 UROLOGY ADULT REFERRAL, no nocturia    R39.12    8. Hyperkalemia E87.5 Potassium- follow up check after lower dose of lisinopril   9. Hyperlipidemia LDL goal <100 E78.5 stable   10. Hypertension, benign essential, goal below 140/90 I10 Well controlled on medications    11. Non morbid obesity due to excess calories E66.09 Weight loss counseling done        End of Life Planning:  Patient currently has an advanced directive: Yes.  Practitioner is supportive of decision.    COUNSELING:  Reviewed preventive health counseling, as reflected in patient instructions       Regular exercise       Healthy diet/nutrition       Vision screening       Hearing screening       Dental care       Colon cancer screening       Prostate cancer screening        Estimated body mass index is 28.36 kg/(m^2) as calculated from the following:    Height as of this encounter: 6' 0.5\" (1.842 m).    Weight as of this encounter: 212 lb (96.2 kg).  Weight management plan: Discussed healthy diet and exercise guidelines and patient will follow up in 6 months in clinic to re-evaluate.   reports that he quit smoking about 27 years ago. He has never used smokeless tobacco.        Appropriate preventive services were discussed with this patient, including applicable screening as appropriate for cardiovascular disease, diabetes, osteopenia/osteoporosis, and glaucoma.  As appropriate for age/gender, discussed screening for colorectal cancer, prostate cancer, " breast cancer, and cervical cancer. Checklist reviewing preventive services available has been given to the patient.    Reviewed patients plan of care and provided an AVS. The Basic Care Plan (routine screening as documented in Health Maintenance) for Michael meets the Care Plan requirement. This Care Plan has been established and reviewed with the Patient.    Counseling Resources:  ATP IV Guidelines  Pooled Cohorts Equation Calculator  Breast Cancer Risk Calculator  FRAX Risk Assessment  ICSI Preventive Guidelines  Dietary Guidelines for Americans, 2010  USDA's MyPlate  ASA Prophylaxis  Lung CA Screening    Janet Can MD  [facility]

## 2017-09-14 NOTE — PATIENT INSTRUCTIONS
How to contact your care team: [phone] Pharmacy [phone]   MD ALTAGRACIA LEONARD PA-C CHRIS JONES, PA-C NAM HO, MD JONATHAN BATES, MD ARVIN VOCAL, MD    Clinic hours M-Th 7am-7pm Fri 7am-5pm.   Urgent care M-F 11am-9pm  Sat/Sun 9am-5pm.   Pharmacy   Mon-Th:  8:00am-8pm   Fri:  8:00am-6:00pm  Sat/Sun  8:00am-5:00 pm       Preventive Health Recommendations:   Male Ages 65 and over    Yearly exam:             See your health care provider every year in order to  o   Review health changes.   o   Discuss preventive care.    o   Review your medicines if your doctor has prescribed any.    Talk with your health care provider about whether you should have a test to screen for prostate cancer (PSA).    Every 3 years, have a diabetes test (fasting glucose). If you are at risk for diabetes, you should have this test more often.    Every 5 years, have a cholesterol test. Have this test more often if you are at risk for high cholesterol or heart disease.     Every 10 years, have a colonoscopy. Or, have a yearly FIT test (stool test). These exams will check for colon cancer.    Talk to with your health care provider about screening for Abdominal Aortic Aneurysm if you have a family history of AAA or have a history of smoking.    Shots:     Get a flu shot each year.     Get a tetanus shot every 10 years.     Talk to your doctor about your pneumonia vaccines. There are now two you should receive - Pneumovax (PPSV 23) and Prevnar (PCV 13).     Talk to your doctor about a shingles vaccine.     Talk to your doctor about the hepatitis B vaccine.  Nutrition:     Eat at least 5 servings of fruits and vegetables each day.     Eat whole-grain bread, whole-wheat pasta and brown rice instead of white grains and rice.     Talk to your provider about Calcium and Vitamin D.   Lifestyle    Exercise for at least 150 minutes a week (30 minutes a day, 5 days a week). This will help you control your weight and prevent  disease.     Limit alcohol to one drink per day.     No smoking.     Wear sunscreen to prevent skin cancer.     See your dentist every six months for an exam and cleaning.     See your eye doctor every 1 to 2 years to screen for conditions such as glaucoma, macular degeneration, cataracts, etc

## 2017-09-14 NOTE — NURSING NOTE
Injectable Influenza Immunization Documentation    1.  Has the patient received the information for the injectable influenza vaccine? YES    2. Is the patient 6 months of age or older? YES    3. Does the patient have any of the following contraindications?          Severe allergy to eggs? No     Severe allergic reaction to previous influenza vaccines? No     Allergy to contact lens solution/thimerosol? No     History of Guillain-Danbury syndrome? No     Undergoing chemotherapy or radiation therapy?       (vaccine should be given at least 2 weeks prior or 3 weeks after)  No     Currently have moderate or severe illness? No         4.  The vaccine has been administered and the patient was instructed to wait 15 minutes before leaving the building in the event of an allergic reaction: YES      Screening Questionnaire for Adult Immunization    Are you sick today?   No   Do you have allergies to medications, food, a vaccine component or latex?   No   Have you ever had a serious reaction after receiving a vaccination?   No   Do you have a long-term health problem with heart disease, lung disease, asthma, kidney disease, metabolic disease (e.g. diabetes), anemia, or other blood disorder?   Yes   Do you have cancer, leukemia, HIV/AIDS, or any other immune system problem?   No   In the past 3 months, have you taken medications that affect  your immune system, such as prednisone, other steroids, or anticancer drugs; drugs for the treatment of rheumatoid arthritis, Crohn s disease, or psoriasis; or have you had radiation treatments?   Yes   Have you had a seizure, or a brain or other nervous system problem?   No   During the past year, have you received a transfusion of blood or blood     products, or been given immune (gamma) globulin or antiviral drug?   No   For women: Are you pregnant or is there a chance you could become        pregnant during the next month?   No   Have you received any vaccinations in the past 4 weeks?    No     Immunization questionnaire was positive for at least one answer.  Approved by Dr Can.       Per orders of Dr. Can, injection of [id number] given by Brad Varner. Patient instructed to remain in clinic for 15 minutes afterwards, and to report any adverse reaction to me immediately.       Screening performed by Brad Varner on 9/14/2017 at 7:55 AM.

## 2017-09-14 NOTE — NURSING NOTE
"Chief Complaint   Patient presents with     Physical     Fasting       Initial /77 (BP Location: Right arm, Patient Position: Chair, Cuff Size: Adult Regular)  Pulse 75  Temp 97.8  F (36.6  C) (Oral)  Ht 6' 0.5\" (1.842 m)  Wt 212 lb (96.2 kg)  SpO2 94%  BMI 28.36 kg/m2 Estimated body mass index is 28.36 kg/(m^2) as calculated from the following:    Height as of this encounter: 6' 0.5\" (1.842 m).    Weight as of this encounter: 212 lb (96.2 kg).  Medication Reconciliation: complete     Brad Varner CMA    "

## 2017-09-14 NOTE — Clinical Note
Ronnie Conte, I noticed the Code says not on file but the healthcare directive is scanned in. Please make changed to the code as needed. Thank you  Brad Varner CMA

## 2017-09-17 NOTE — PROGRESS NOTES
Dear Michael    Your test results are attached. I am happy to let you know that they are stable.    The potassium is back to normal. Your A1C is still in good range at 7.0. We can recheck labs in 6 months.     Please contact me by 1bibt if you have any questions about your labs or management.    Janet Can MD

## 2017-09-29 ENCOUNTER — DOCUMENTATION ONLY (OUTPATIENT)
Dept: OTHER | Facility: CLINIC | Age: 67
End: 2017-09-29

## 2017-09-29 DIAGNOSIS — Z71.89 ADVANCED DIRECTIVES, COUNSELING/DISCUSSION: Chronic | ICD-10-CM

## 2017-10-18 ENCOUNTER — DOCUMENTATION ONLY (OUTPATIENT)
Dept: OTHER | Facility: CLINIC | Age: 67
End: 2017-10-18

## 2017-10-31 ENCOUNTER — OFFICE VISIT (OUTPATIENT)
Dept: FAMILY MEDICINE | Facility: CLINIC | Age: 67
End: 2017-10-31
Payer: COMMERCIAL

## 2017-10-31 VITALS
BODY MASS INDEX: 28.49 KG/M2 | HEIGHT: 73 IN | OXYGEN SATURATION: 96 % | WEIGHT: 215 LBS | HEART RATE: 103 BPM | SYSTOLIC BLOOD PRESSURE: 125 MMHG | TEMPERATURE: 98.3 F | DIASTOLIC BLOOD PRESSURE: 73 MMHG

## 2017-10-31 DIAGNOSIS — R30.0 DYSURIA: Primary | ICD-10-CM

## 2017-10-31 DIAGNOSIS — Z13.6 SCREENING FOR AAA (ABDOMINAL AORTIC ANEURYSM): ICD-10-CM

## 2017-10-31 LAB
ALBUMIN UR-MCNC: NEGATIVE MG/DL
APPEARANCE UR: ABNORMAL
BACTERIA #/AREA URNS HPF: ABNORMAL /HPF
BILIRUB UR QL STRIP: NEGATIVE
CASTS #/AREA URNS LPF: ABNORMAL /LPF (ref 0–2)
COLOR UR AUTO: YELLOW
GLUCOSE UR STRIP-MCNC: 250 MG/DL
HGB UR QL STRIP: NEGATIVE
KETONES UR STRIP-MCNC: NEGATIVE MG/DL
LEUKOCYTE ESTERASE UR QL STRIP: NEGATIVE
MUCOUS THREADS #/AREA URNS LPF: PRESENT /LPF
NITRATE UR QL: NEGATIVE
PH UR STRIP: 5.5 PH (ref 5–7)
RBC #/AREA URNS AUTO: ABNORMAL /HPF
SOURCE: ABNORMAL
SP GR UR STRIP: 1.02 (ref 1–1.03)
UROBILINOGEN UR STRIP-ACNC: 0.2 EU/DL (ref 0.2–1)
WBC #/AREA URNS AUTO: ABNORMAL /HPF

## 2017-10-31 PROCEDURE — 87086 URINE CULTURE/COLONY COUNT: CPT | Performed by: FAMILY MEDICINE

## 2017-10-31 PROCEDURE — 81001 URINALYSIS AUTO W/SCOPE: CPT | Performed by: FAMILY MEDICINE

## 2017-10-31 PROCEDURE — 99214 OFFICE O/P EST MOD 30 MIN: CPT | Performed by: FAMILY MEDICINE

## 2017-10-31 RX ORDER — CIPROFLOXACIN 500 MG/1
500 TABLET, FILM COATED ORAL 2 TIMES DAILY
Qty: 14 TABLET | Refills: 0 | Status: CANCELLED | OUTPATIENT
Start: 2017-10-31 | End: 2017-11-07

## 2017-10-31 RX ORDER — CIPROFLOXACIN 500 MG/1
500 TABLET, FILM COATED ORAL 2 TIMES DAILY
Qty: 20 TABLET | Refills: 0 | Status: SHIPPED | OUTPATIENT
Start: 2017-10-31 | End: 2017-11-10

## 2017-10-31 ASSESSMENT — PAIN SCALES - GENERAL: PAINLEVEL: NO PAIN (0)

## 2017-10-31 NOTE — PATIENT INSTRUCTIONS
At [facility], we strive to deliver an exceptional experience to you, every time we see you.  If you receive a survey in the mail, please send us back your thoughts. We really do value your feedback.    Based on your medical history, these are the current health maintenance/preventive care services that you are due for (some may have been done at this visit.)  Health Maintenance Due   Topic Date Due     AORTIC ANEURYSM SCREENING (SYSTEM ASSIGNED)  07/12/2017     FOOT EXAM Q1 YEAR  11/25/2017         Suggested websites for health information:  Www.blabfeed.org : Up to date and easily searchable information on multiple topics.  Www.[employer].gov : medication info, interactive tutorials, watch real surgeries online  Www.familydoctor.org : good info from the Academy of Family Physicians  Www.cdc.gov : public health info, travel advisories, epidemics (H1N1)  Www.aap.org : children's health info, normal development, vaccinations  Www.health.Replaced by [facility].[place].us : [place] dept of health, public health issues in [place], N1N1    Your care team:                            Family Medicine Internal Medicine   MD Lakhwinder Daley MD Shantel Branch-Fleming, MD Katya Georgiev PA-C Nam Ho, MD Pediatrics   RAZ Morejon, BANDAR Carroll APRN CNP   MD Shauna Campbell MD Deborah Mielke, MD Kim Thein, APRN CNP      Clinic hours: Monday - Thursday 7 am-7 pm; Fridays 7 am-5 pm.   Urgent care: Monday - Friday 11 am-9 pm; Saturday and Sunday 9 am-5 pm.  Pharmacy : Monday -Thursday 8 am-8 pm; Friday 8 am-6 pm; Saturday and Sunday 9 am-5 pm.     Clinic: [phone]   Pharmacy: [phone]    Please call the [facility]  [phone] to schedule your abdominal aorta ultrasound (or they may call you).   Understanding Abdominal Aortic Aneurysm  You may have been told that you have an aneurysm. This is when a weakened part of a blood vessel  expands like a balloon. An aneurysm in the main blood vessel in your stomach area is called an abdominal aortic aneurysm (AAA).  What is AAA?     An aneurysm happens when a weakened part of the aorta wall stretches and expands.     The aorta is the large artery that carries blood from the heart to the rest of the body. With AAA, part of the aorta weakens and expands. If an aneurysm gets large enough, it may burst. This is very serious, and usually fatal.  How is an aneurysm found?  AAA usually causes no symptoms. It is often found when tests (such as an X-ray, MRI, or CT scan) are done for an unrelated problem. Or your healthcare provider may find it while feeling your stomach during a routine exam.  Who develops AAA?  These things increase your chances of having AAA:    AAA runs in your family    Your age--AAA is more likely as you get older    Men are more likely than women to have AAA    Smoking    High blood pressure    High cholesterol level (a buildup of fat and other materials in the blood)    Injury (such as a car accident  What can be done?  Surgery can be done to remove an aneurysm. Your healthcare provider will weigh the chances that the aneurysm will burst against the risks of treatment. Because a small aneurysm is not likely to burst, it may be watched for a while. When it reaches a certain size, you may have surgery to replace that section of your aorta.  Date Last Reviewed: 4/26/2016 2000-2017 [employer] [address] [place] [id number]. All rights reserved. This information is not intended as a substitute for professional medical care. Always follow your healthcare professional's instructions.

## 2017-10-31 NOTE — NURSING NOTE
"Chief Complaint   Patient presents with     UTI       Initial /73 (BP Location: Left arm, Patient Position: Chair, Cuff Size: Adult Large)  Pulse 103  Temp 98.3  F (36.8  C) (Oral)  Ht 6' 0.5\" (1.842 m)  Wt 215 lb (97.5 kg)  SpO2 96%  BMI 28.76 kg/m2 Estimated body mass index is 28.76 kg/(m^2) as calculated from the following:    Height as of this encounter: 6' 0.5\" (1.842 m).    Weight as of this encounter: 215 lb (97.5 kg).  Medication Reconciliation: complete   JUSTIN German MA      "

## 2017-10-31 NOTE — PROGRESS NOTES
SUBJECTIVE:   Michael Stallings is a 67 year old male who presents to clinic today for the following health issues:      Genitourinary - Male  Onset:  about 1 month ago    Description:   Dysuria (painful urination): YES - burning, but not a lot of pain  Hematuria (blood in urine): no   Frequency: no   Are you urinating at night : no   Hesitancy (delay in urine): YES- at night  Retention (unable to empty): no   Decrease in urinary flow: no   Incontinence: no     Progression of Symptoms:  Intermittent - always a degree of burning, but comes and goes in severity    Accompanying Signs & Symptoms:  Fever: no   Back/Flank pain: YES  Urethral discharge: no   Testicle lumps/masses/pain: no   Nausea and/or vomiting: no   Abdominal pain: no     History:   History of frequent UTI's: no   History of kidney stones: no   History of hernias: no   Personal or Family history of Prostate problems: no  Sexually active: YES - denies intercourse with anyone new    Precipitating factors:   none    Alleviating factors:  none    Of note, he is leaving for [place] soon and will be back in May 2018.     Past medical, family, and social histories, medications, and allergies are reviewed and updated in Epic.     ROS:  C: NEGATIVE for fever, chills, change in weight  E/M: NEGATIVE for ear, mouth and throat problems  R: NEGATIVE for significant cough or SOB  CV: NEGATIVE for chest pain, palpitations or peripheral edema  ENDO: He reports his diabetes is well controlled, consistent with HgbA1c of 7.0% 1.5 months ago. His last blood sugar check was in the 140s and in general he reports these readings recently have been higher than normal but not super concerning for him.  ROS otherwise negative    This document serves as a record of the services and decisions personally performed and made by Dr. Pereira. It was created on his behalf by Gabriela Aden, a trained medical scribe. The creation of this document is based the provider's statements to  "the medical scribe.  Gabriela Aden October 31, 2017 3:51 PM     OBJECTIVE:                                                    /73 (BP Location: Left arm, Patient Position: Chair, Cuff Size: Adult Large)  Pulse 103  Temp 98.3  F (36.8  C) (Oral)  Ht 1.842 m (6' 0.5\")  Wt 97.5 kg (215 lb)  SpO2 96%  BMI 28.76 kg/m2   Body mass index is 28.76 kg/(m^2).     GENERAL: healthy, alert and no distress  EYES: Eyes grossly normal to inspection, PERRL, EOMI, sclerae white and conjunctivae normal  RESP: lungs clear to auscultation - no crackles or wheezes, no areas of dullness, no tachypnea  CV: Heart regular rate and rhythm without murmur, click or rub. No peripheral edema and peripheral pulses strong  BACK: No CVA tenderness.  ABDOMEN: Mild suprapubic tenderness. Otherwise soft, no hepatosplenomegaly, no masses and bowel sounds normal   MS: no gross musculoskeletal defects noted, no edema  SKIN: no suspicious lesions or rashes  NEURO: Normal strength and tone, sensory exam grossly normal, mentation intact, oriented times 3 and cranial nerves 2-12 intact  PSYCH: mentation appears normal, affect normal/bright     Diagnostic Test Results:  Results for orders placed or performed in visit on 10/31/17 (from the past 24 hour(s))   UA with Microscopic   Result Value Ref Range    Color Urine Yellow     Appearance Urine Slightly Cloudy     Glucose Urine 250 (A) NEG^Negative mg/dL    Bilirubin Urine Negative NEG^Negative    Ketones Urine Negative NEG^Negative mg/dL    Specific Gravity Urine 1.025 1.003 - 1.035    pH Urine 5.5 5.0 - 7.0 pH    Protein Albumin Urine Negative NEG^Negative mg/dL    Urobilinogen Urine 0.2 0.2 - 1.0 EU/dL    Nitrite Urine Negative NEG^Negative    Blood Urine Negative NEG^Negative    Leukocyte Esterase Urine Negative NEG^Negative    Source Midstream Urine     WBC Urine 2-5 (A) OTO2^O - 2 /HPF    RBC Urine O - 2 OTO2^O - 2 /HPF    Cast Urine 2-5 0 - 2 /LPF    Bacteria Urine Few (A) NEG^Negative " /HPF    Mucous Urine Present (A) NEG^Negative /LPF         ASSESSMENT/PLAN:                                                      (R30.0) Dysuria  (primary encounter diagnosis)  Comment: Rule out acute cystitis or acute prostatitis. He is wintering in [place] and plans to leave soon.   Plan: UA with Microscopic, Urine Culture Aerobic         Bacterial, ciprofloxacin (CIPRO) 500 MG tablet        Return (or see an outside provider) in about 10 days (around 11/10/2017) for recheck if symptoms fail to resolve by then.     (Z13.6) Screening for AAA (abdominal aortic aneurysm)  Comment: indications for screening discussed with the patient   Plan: [facility] NOTIFICATION        Handouts provided.      The information in this document, created by the medical scribe for me, accurately reflects the services I personally performed and the decisions made by me. I have reviewed and approved this document for accuracy prior to leaving the patient care area. October 31, 2017 3:51 PM   Darell Pereira MD

## 2017-10-31 NOTE — MR AVS SNAPSHOT
After Visit Summary   10/31/2017    Michael Stallings    MRN: [id number]           Patient Information     Date Of Birth          1950        Visit Information        Provider Department      10/31/2017 3:20 PM Darell Pereira MD [facility]        Today's Diagnoses     Dysuria    -  1    Screening for AAA (abdominal aortic aneurysm)          Care Instructions    At [facility], we strive to deliver an exceptional experience to you, every time we see you.  If you receive a survey in the mail, please send us back your thoughts. We really do value your feedback.    Based on your medical history, these are the current health maintenance/preventive care services that you are due for (some may have been done at this visit.)  Health Maintenance Due   Topic Date Due     AORTIC ANEURYSM SCREENING (SYSTEM ASSIGNED)  07/12/2017     FOOT EXAM Q1 YEAR  11/25/2017         Suggested websites for health information:  Www.[employer] : Up to date and easily searchable information on multiple topics.  Www.medlineplus.gov : medication info, interactive tutorials, watch real surgeries online  Www.familydoctor.org : good info from the Academy of Family Physicians  Www.cdc.gov : public health info, travel advisories, epidemics (H1N1)  Www.aap.org : children's health info, normal development, vaccinations  Www.health.state.[place].us : [place] dept of health, public health issues in [place], N1N1    Your care team:                            Family Medicine Internal Medicine   MD Lakhwinder Daley MD Shantel Branch-Fleming, MD Katya Georgiev PA-C Nam Ho, MD Pediatrics   RAZ Morejon, MD Shauna Yee CNP, MD Deborah Mielke, MD Kim Thein, APRN CNP      Clinic hours: Monday - Thursday 7 am-7 pm; Fridays 7 am-5 pm.   Urgent care: Monday - Friday 11 am-9 pm; Saturday and Sunday 9 am-5 pm.  Pharmacy :  Monday -Thursday 8 am-8 pm; Friday 8 am-6 pm; Saturday and Sunday 9 am-5 pm.     Clinic: [phone]   Pharmacy: [phone]    Please call the [facility]  [phone] to schedule your abdominal aorta ultrasound (or they may call you).   Understanding Abdominal Aortic Aneurysm  You may have been told that you have an aneurysm. This is when a weakened part of a blood vessel expands like a balloon. An aneurysm in the main blood vessel in your stomach area is called an abdominal aortic aneurysm (AAA).  What is AAA?     An aneurysm happens when a weakened part of the aorta wall stretches and expands.     The aorta is the large artery that carries blood from the heart to the rest of the body. With AAA, part of the aorta weakens and expands. If an aneurysm gets large enough, it may burst. This is very serious, and usually fatal.  How is an aneurysm found?  AAA usually causes no symptoms. It is often found when tests (such as an X-ray, MRI, or CT scan) are done for an unrelated problem. Or your healthcare provider may find it while feeling your stomach during a routine exam.  Who develops AAA?  These things increase your chances of having AAA:    AAA runs in your family    Your age--AAA is more likely as you get older    Men are more likely than women to have AAA    Smoking    High blood pressure    High cholesterol level (a buildup of fat and other materials in the blood)    Injury (such as a car accident  What can be done?  Surgery can be done to remove an aneurysm. Your healthcare provider will weigh the chances that the aneurysm will burst against the risks of treatment. Because a small aneurysm is not likely to burst, it may be watched for a while. When it reaches a certain size, you may have surgery to replace that section of your aorta.  Date Last Reviewed: 4/26/2016 2000-2017 [employer]. [address] [facility], [place] [address]. All rights reserved. This  "information is not intended as a substitute for professional medical care. Always follow your healthcare professional's instructions.                Follow-ups after your visit        Follow-up notes from your care team     Return in about 10 days (around 11/10/2017) for recheck if symptoms fail to resolve by then.      Who to contact     If you have questions or need follow up information about today's clinic visit or your schedule please contact [facility] [place] directly at [phone].  Normal or non-critical lab and imaging results will be communicated to you by [employer], letter or phone within 4 business days after the clinic has received the results. If you do not hear from us within 7 days, please contact the clinic through [employer] Â[employer] or phone. If you have a critical or abnormal lab result, we will notify you by phone as soon as possible.  Submit refill requests through [employer] or call your pharmacy and they will forward the refill request to us. Please allow 3 business days for your refill to be completed.          Additional Information About Your Visit        [employer] Information     [employer] gives you secure access to your electronic health record. If you see a primary care provider, you can also send messages to your care team and make appointments. If you have questions, please call your primary care clinic.  If you do not have a primary care provider, please call [phone] and they will assist you.        Care EveryWhere ID     This is your Care EveryWhere ID. This could be used by other organizations to access your [place] medical records  [id number]        Your Vitals Were     Pulse Temperature Height Pulse Oximetry BMI (Body Mass Index)       103 98.3  F (36.8  C) (Oral) 1.842 m (6' 0.5\") 96% 28.76 kg/m2        Blood Pressure from Last 3 Encounters:   10/31/17 125/73   09/14/17 117/77   06/12/17 106/71    Weight from Last 3 Encounters:   10/31/17 97.5 kg (215 lb)   09/14/17 96.2 kg " (212 lb)   06/12/17 94.3 kg (208 lb)              We Performed the Following     AORTIC CENTER NOTIFICATION     UA with Microscopic     Urine Culture Aerobic Bacterial          Today's Medication Changes          These changes are accurate as of: 10/31/17  4:28 PM.  If you have any questions, ask your nurse or doctor.               Start taking these medicines.        Dose/Directions    ciprofloxacin 500 MG tablet   Commonly known as:  CIPRO   Used for:  Dysuria   Started by:  Darell Pereira MD        Dose:  500 mg   Take 1 tablet (500 mg) by mouth 2 times daily for 10 days for possible urinary infection.   Quantity:  20 tablet   Refills:  0            Where to get your medicines      These medications were sent to [id number] - [facility], [place] - [address] AT [facility] [address], [facility] [address]     Phone:  [phone]     ciprofloxacin 500 MG tablet                Primary Care Provider Office Phone # Fax #    Janet Dottie Can [phone] [address]  [facility] [address]        Equal Access to Services     [facility]LEY [facility] CenterMACK : Hadii aad ku hadasho Soomaali, waaxda luqadaha, qaybta kaalmada adeegyada, eliot colvin . So [facility] [phone].    ATENCIÓN: Si habla español, tiene a muhammad disposición servicios gratuitos de asistencia lingüística. Ll[facility] [phone].    We comply with applicable federal civil rights laws and [place] laws. We do not discriminate on the basis of race, color, national origin, age, disability, sex, sexual orientation, or gender identity.            Thank you!     Thank you for choosing [facility]  for your care. Our goal is always to provide you with excellent care. Hearing back from our patients is one way we can continue to improve our services. Please take a few minutes to complete the written survey that you may receive in the mail after your visit with  us. Thank you!             Your Updated Medication List - Protect others around you: Learn how to safely use, store and throw away your medicines at www.disposemymeds.org.          This list is accurate as of: 10/31/17  4:28 PM.  Always use your most recent med list.                   Brand Name Dispense Instructions for use Diagnosis    albuterol 108 (90 BASE) MCG/ACT Inhaler    PROAIR HFA/PROVENTIL HFA/VENTOLIN HFA    1 Inhaler    Inhale 2 puffs into the lungs every 6 hours as needed for shortness of breath / dyspnea    Cough       aspirin 81 MG tablet     100 tablet    Take 1 tablet (81 mg) by mouth daily    Type 2 diabetes mellitus without complication, with long-term current use of insulin (H)       BD FATOU U/F 32G X 4 MM   Generic drug:  insulin pen needle     200 each    Inject Subcutaneous 2 times daily Use 2 pen needles daily or as directed.    Type 2 diabetes mellitus without complication, with long-term current use of insulin (H)       blood glucose monitoring test strip    ACCU-CHEK CATE PLUS    200 each    1 strip by In Vitro route 2 times daily Use to test blood sugar 2 times daily or as directed.    Type 2 diabetes mellitus without complication, with long-term current use of insulin (H)       ciprofloxacin 500 MG tablet    CIPRO    20 tablet    Take 1 tablet (500 mg) by mouth 2 times daily for 10 days for possible urinary infection.    Dysuria       fish oil-omega-3 fatty acids 1000 MG capsule      Take 1 g by mouth 2 times daily.        fluticasone 50 MCG/ACT spray    FLONASE    16 mL    SHAKE LIQUID AND USE 1 TO 2 SPRAYS IN EACH NOSTRIL DAILY    Chronic rhinitis, unspecified type       folic acid 1 MG tablet    FOLVITE     Take 1 mg by mouth daily.        glimepiride 1 MG tablet    AMARYL    90 tablet    Take 1 tablet (1 mg) by mouth every morning    Type 2 diabetes mellitus without complication, unspecified long term insulin use status (H)       insulin glargine 100 UNIT/ML injection    LANTUS  SOLOSTAR    36 mL    Inject 40 Units Subcutaneous At Bedtime    Type 2 diabetes mellitus without complication, with long-term current use of insulin (H)       lisinopril 10 MG tablet    PRINIVIL/ZESTRIL    90 tablet    Take 1 tablet (10 mg) by mouth daily    Essential hypertension, benign       metFORMIN 1000 MG tablet    GLUCOPHAGE    180 tablet    Take 1 tablet (1,000 mg) by mouth 2 times daily (with meals)    Type 2 diabetes mellitus without complication, with long-term current use of insulin (H)       methotrexate (Anti-Rheumatic) 2.5 MG Tabs      Take 8 tablets by mouth once a week.        simvastatin 40 MG tablet    ZOCOR    90 tablet    Take 1 tablet (40 mg) by mouth daily    Hyperlipidemia, unspecified hyperlipidemia type

## 2017-11-01 ENCOUNTER — DOCUMENTATION ONLY (OUTPATIENT)
Dept: VASCULAR SURGERY | Facility: CLINIC | Age: 67
End: 2017-11-01

## 2017-11-01 DIAGNOSIS — Z87.891 FORMER TOBACCO USE: Primary | ICD-10-CM

## 2017-11-02 ENCOUNTER — TRANSFERRED RECORDS (OUTPATIENT)
Dept: HEALTH INFORMATION MANAGEMENT | Facility: CLINIC | Age: 67
End: 2017-11-02

## 2017-11-02 LAB
ALT SERPL-CCNC: 49 IU/L (ref 5–40)
AST SERPL-CCNC: 21 U/L (ref 5–34)
BACTERIA SPEC CULT: NO GROWTH
CREAT SERPL-MCNC: 1 MG/DL (ref 0.5–1.3)
GFR SERPL CREATININE-BSD FRML MDRD: 79.7 ML/MIN/1.73M2
SPECIMEN SOURCE: NORMAL

## 2017-12-27 DIAGNOSIS — J31.0 CHRONIC RHINITIS, UNSPECIFIED TYPE: ICD-10-CM

## 2017-12-27 DIAGNOSIS — E78.5 HYPERLIPIDEMIA, UNSPECIFIED HYPERLIPIDEMIA TYPE: ICD-10-CM

## 2017-12-27 DIAGNOSIS — E11.9 TYPE 2 DIABETES MELLITUS WITHOUT COMPLICATION, UNSPECIFIED LONG TERM INSULIN USE STATUS: ICD-10-CM

## 2017-12-28 NOTE — TELEPHONE ENCOUNTER
Requested Prescriptions   Pending Prescriptions Disp Refills     simvastatin (ZOCOR) 40 MG tablet [Pharmacy Med Name: SIMVASTATIN 40MG TABLETS]    Last Written Prescription Date:  11/25/16  Last Fill Quantity: 90,  # refills: 3   [facility] or [facility] prescribing provider:  10/31/17   Future Office Visit:      90 tablet 0     Sig: TAKE 1 TABLET(40 MG) BY MOUTH DAILY    Statins Protocol Passed    12/27/2017  3:15 PM       Passed - LDL on file in past 12 months    Recent Labs   Lab Test  06/12/17   1005   LDL  114*            Passed - No abnormal creatine kinase in past 12 months    No lab results found.         Passed - Recent or future visit with authorizing provider    Patient had office visit in the last year or has a visit in the next 30 days with authorizing provider.  See chart review.              Passed - Patient is age 18 or older        glimepiride (AMARYL) 1 MG tablet [Pharmacy Med Name: GLIMEPIRIDE 1MG TABLETS]    Last Written Prescription Date:  1/12/17  Last Fill Quantity: 90,  # refills: 3   Last [facility] or [facility] prescribing provider:  10/31/17   Future Office Visit:      90 tablet 0     Sig: TAKE 1 TABLET(1 MG) BY MOUTH EVERY MORNING    Sulfonylurea Agents Passed    12/27/2017  3:15 PM       Passed - Patient's BP is less than 140/90    BP Readings from Last 3 Encounters:   10/31/17 125/73   09/14/17 117/77   06/12/17 106/71                Passed - Patient has documented LDL within the past 12 mos.    Recent Labs   Lab Test  06/12/17   1005   LDL  114*            Passed - Patient has had a Microalbumin in the past 12 mos.    Recent Labs   Lab Test  06/12/17   0935   07/06/12   1237   [id number]   --    --   15.5   [id number]   --    --   7.9   MICROL  8   < >   --    UMALCR  8.76   < >   --     < > = values in this interval not displayed.            Passed - Patient has documented A1c within the specified period of time.    Recent Labs   Lab Test  09/14/17   0753    A1C  7.0*            Passed - Patient is age 18 or older       Passed - Patient has a recent creatinine (normal) within the past 12 mos.    Recent Labs   Lab Test 11/02/17   CR  1.000            Passed - Patient has had an appointment with authorizing provider within the past 6 mos. or  within next 30 days    Patient had office visit in the last 6 months or has a visit in the next 30 days with authorizing provider.  See chart review.                   Raheem Faarax  [facility]

## 2017-12-28 NOTE — TELEPHONE ENCOUNTER
Requested Prescriptions   Pending Prescriptions Disp Refills     fluticasone (FLONASE) 50 MCG/ACT spray [Pharmacy Med Name: FLUTICASONE 50MCG NASAL SP (120) RX]    Last Written Prescription Date:  8/22/17  Last Fill Quantity: 16 ml,  # refills: 1   Last Office Visit with [facility] or [facility] prescribing provider:  10/31/17   Future Office Visit:      16 mL 0     Sig: SHAKE LIQUID AND USE 1 TO 2 SPRAYS IN EACH NOSTRIL DAILY    Inhaled Steroids Protocol Passed    12/27/2017  3:15 PM       Passed - Patient is age 12 or older       Passed - Recent or future visit with authorizing provider's specialty    Patient had office visit in the last year or has a visit in the next 30 days with authorizing provider.  See chart review.                     Raheem Faarax  [facility]

## 2018-01-02 RX ORDER — SIMVASTATIN 40 MG
TABLET ORAL
Qty: 90 TABLET | Refills: 0 | Status: SHIPPED | OUTPATIENT
Start: 2018-01-02

## 2018-01-02 RX ORDER — FLUTICASONE PROPIONATE 50 MCG
SPRAY, SUSPENSION (ML) NASAL
Qty: 16 ML | Refills: 3 | Status: SHIPPED | OUTPATIENT
Start: 2018-01-02

## 2018-01-02 RX ORDER — GLIMEPIRIDE 1 MG/1
TABLET ORAL
Qty: 90 TABLET | Refills: 0 | Status: SHIPPED | OUTPATIENT
Start: 2018-01-02

## 2018-01-03 NOTE — TELEPHONE ENCOUNTER
Pres[place] per [facility] [place]..  Jose Antonio Davis, RN, BSN

## 2018-01-03 NOTE — TELEPHONE ENCOUNTER
Prescription [place] per [facility] [place]..  Jose Antonio Davis, RN, BSN

## 2018-01-15 DIAGNOSIS — E11.9 TYPE 2 DIABETES MELLITUS WITHOUT COMPLICATION, UNSPECIFIED LONG TERM INSULIN USE STATUS: ICD-10-CM

## 2018-01-16 RX ORDER — GLIMEPIRIDE 1 MG/1
TABLET ORAL
Qty: 90 TABLET | Refills: 0 | OUTPATIENT
Start: 2018-01-16

## 2018-02-04 DIAGNOSIS — E11.9 TYPE 2 DIABETES MELLITUS WITHOUT COMPLICATION, WITH LONG-TERM CURRENT USE OF INSULIN (H): ICD-10-CM

## 2018-02-04 DIAGNOSIS — Z79.4 TYPE 2 DIABETES MELLITUS WITHOUT COMPLICATION, WITH LONG-TERM CURRENT USE OF INSULIN (H): ICD-10-CM

## 2018-02-04 NOTE — TELEPHONE ENCOUNTER
"Requested Prescriptions   Pending Prescriptions Disp Refills     metFORMIN (GLUCOPHAGE) 1000 MG tablet [Pharmacy Med Name: METFORMIN 1000MG TABLETS]    Last Written Prescription Date:  11/25/16  Last Fill Quantity: 180,  # refills: 3   Last Office Visit with [facility] or [facility] prescribing provider:  10/31/17   Future Office Visit:      180 tablet 0     Sig: TAKE 1 TABLET(1000 MG) BY MOUTH TWICE DAILY WITH MEALS    Biguanide Agents Passed    2/4/2018  9:19 AM       Passed - Patient's BP is less than 140/90    BP Readings from Last 3 Encounters:   10/31/17 125/73   09/14/17 117/77   06/12/17 106/71                Passed - Patient has documented LDL within the past 12 mos.    Recent Labs   Lab Test  06/12/17   1005   LDL  114*            Passed - Patient has had a Microalbumin in the past 12 mos.    Recent Labs   Lab Test  06/12/17   0935   07/06/12   1237   [id number]   --    --   15.5   [id number]   --    --   7.9   MICROL  8   < >   --    UMALCR  8.76   < >   --     < > = values in this interval not displayed.            Passed - Patient is age 10 or older       Passed - Patient has documented A1c within the specified period of time.    Recent Labs   Lab Test  09/14/17   0758   A1C  7.0*            Passed - Patient's CR is NOT>1.4 OR Patient's EGFR is NOT<45 within past 12 mos.    Recent Labs   Lab Test 11/02/17 06/12/17   1005   GFRESTIMATED  79.7   < >  54*   GFRESTBLACK   --    --   66    < > = values in this interval not displayed.       Recent Labs   Lab Test 11/02/17   CR  1.000            Passed - Patient does NOT have a diagnosis of CHF.       Passed - Recent (6 mos) or future visit with authorizing provider's specialty    Patient had office visit in the last 6 months or has a visit in the next 30 days with authorizing provider.  See \"Patient Info\" tab in inbasket, or \"Choose Columns\" in Meds & Orders section of the refill encounter.                  Raheme Faarax  [facility]  "

## 2018-02-07 NOTE — TELEPHONE ENCOUNTER
Prescription approved per [facility] [place] Protocol.  Coty Keys RN

## 2018-08-21 ENCOUNTER — TELEPHONE (OUTPATIENT)
Dept: FAMILY MEDICINE | Facility: CLINIC | Age: 68
End: 2018-08-21

## 2018-08-21 NOTE — LETTER
August 21, 2018      Michael Stallings  [address]  [facility] [address]      Dear Michael Stallings,      At [facility] we care about your health and are committed to providing quality patient care, which includes staying current on preventative cancer screenings.  You can increase your chances of finding and treating cancers through regular screenings.      Our records show that you are due for the following screening(s):    Colonoscopy for colon cancer  Specialty Schedule Number [phone]  Recommended every ten years for everyone age 50 and older  We strongly urge our patient's to consider having a colonoscopy done, which is the best screening test available and only needs to be done every 10 years if normal.      Other option is that you can do a FIT and this is once a year.  Any questions or concern, please contact us at [phone].    You may contact the closest location to schedule the screening test(s) at your earliest convenience.    If you have already had one or all of the above screening tests at another facility, please call us so that we may update your chart.      Sincerely,         Janet Can MD, MPH / NS  [facility]

## 2018-08-21 NOTE — TELEPHONE ENCOUNTER
Panel Management Review      BP Readings from Last 1 Encounters:   10/31/17 125/73      Last Office Visit with this department: Visit date not found    Patient is due/failing the following:   COLONOSCOPY    Action needed:   Patient needs to do colonoscopy.    Type of outreach:    Sent letter.    Questions for provider review:    None                                                                                                                                    Hilda Johnson MA

## 2019-10-02 ENCOUNTER — HEALTH MAINTENANCE LETTER (OUTPATIENT)
Age: 69
End: 2019-10-02

## 2019-12-15 ENCOUNTER — HEALTH MAINTENANCE LETTER (OUTPATIENT)
Age: 69
End: 2019-12-15

## 2021-01-15 ENCOUNTER — HEALTH MAINTENANCE LETTER (OUTPATIENT)
Age: 71
End: 2021-01-15

## 2021-09-04 ENCOUNTER — HEALTH MAINTENANCE LETTER (OUTPATIENT)
Age: 71
End: 2021-09-04

## 2022-02-19 ENCOUNTER — HEALTH MAINTENANCE LETTER (OUTPATIENT)
Age: 72
End: 2022-02-19

## 2022-04-16 ENCOUNTER — HEALTH MAINTENANCE LETTER (OUTPATIENT)
Age: 72
End: 2022-04-16

## 2022-10-22 ENCOUNTER — HEALTH MAINTENANCE LETTER (OUTPATIENT)
Age: 72
End: 2022-10-22

## 2023-04-01 ENCOUNTER — HEALTH MAINTENANCE LETTER (OUTPATIENT)
Age: 73
End: 2023-04-01

## 2023-06-01 ENCOUNTER — HEALTH MAINTENANCE LETTER (OUTPATIENT)
Age: 73
End: 2023-06-01

## 2023-09-07 ENCOUNTER — OFFICE VISIT (OUTPATIENT)
Dept: URGENT CARE | Facility: URGENT CARE | Age: 73
End: 2023-09-07
Payer: MEDICARE

## 2023-09-07 VITALS
TEMPERATURE: 97.1 F | WEIGHT: 202.8 LBS | HEART RATE: 91 BPM | BODY MASS INDEX: 27.13 KG/M2 | OXYGEN SATURATION: 95 % | DIASTOLIC BLOOD PRESSURE: 72 MMHG | SYSTOLIC BLOOD PRESSURE: 112 MMHG

## 2023-09-07 DIAGNOSIS — J01.90 ACUTE SINUSITIS WITH SYMPTOMS > 10 DAYS: Primary | ICD-10-CM

## 2023-09-07 PROCEDURE — 99203 OFFICE O/P NEW LOW 30 MIN: CPT

## 2023-09-07 RX ORDER — FINASTERIDE 5 MG/1
5 TABLET, FILM COATED ORAL DAILY
COMMUNITY

## 2023-09-07 RX ORDER — DOXYCYCLINE HYCLATE 100 MG
100 TABLET ORAL 2 TIMES DAILY
Qty: 14 TABLET | Refills: 0 | Status: SHIPPED | OUTPATIENT
Start: 2023-09-07 | End: 2023-09-14

## 2023-09-07 RX ORDER — GLIPIZIDE 5 MG/1
5 TABLET, FILM COATED, EXTENDED RELEASE ORAL DAILY
COMMUNITY

## 2023-09-07 NOTE — PATIENT INSTRUCTIONS
Take the antibiotic as prescribed and finish the full course even if symptoms improve.  Try yogurt with active cultures or probiotics such as Culturelle daily to help prevent diarrhea while using antibiotics.  Get plenty of rest and drink fluids.  Can use Tylenol as needed for pain.  Maximum dose of Tylenol is 3000mg in a 24 hour period of time.

## 2023-09-07 NOTE — PROGRESS NOTES
ASSESSMENT:  (J01.90) Acute sinusitis with symptoms > 10 days  (primary encounter diagnosis)  Plan: doxycycline hyclate (VIBRA-TABS) 100 MG tablet    PLAN:  Acute sinusitis patient instructions discussed and provided.  Informed the patient to take the antibiotic as prescribed and finish the full course even if symptoms improve.  We discussed trying yogurt with active cultures or probiotic such as Culturelle daily to help prevent diarrhea while taking the antibiotic.  Informed the patient to get plenty of rest, drink fluids and use Tylenol as needed for pain with the maximum dose being 3000 mg in a 24-hour period of time.  Discussed the need to return to clinic with any new or worsening symptoms.  Patient acknowledged his understanding of the above plan.    The use of Dragon/PowerMic dictation services may have been used to construct the content in this note; any grammatical or spelling errors are non-intentional. Please contact the author of this note directly if you are in need of any clarification.      SUBJECTIVE:   Michael Stallings is a 73 year old male presenting with a chief complaint of runny nose, stuffy nose, cough - non-productive, facial pain/pressure, and headache.  Onset of symptoms was 10 day(s) ago.  Course of illness is worsening.    Patient denies: fever, sore throat, vomiting, and diarrhea  Treatment measures tried include Tylenol  Predisposing factors include None.    ROS:  Negative except noted above.    OBJECTIVE:  /72 (BP Location: Left arm, Patient Position: Sitting, Cuff Size: Adult Regular)   Pulse 91   Temp 97.1  F (36.2  C) (Tympanic)   Wt 92 kg (202 lb 12.8 oz)   SpO2 95%   BMI 27.13 kg/m    GENERAL APPEARANCE: healthy, alert and no distress  EYES: EOMI,  PERRL, conjunctiva clear  HENT: nasal turbinates erythematous, swollen, rhinorrhea clear, and oral mucous membranes moist, no erythema noted  NECK: supple, nontender, no lymphadenopathy  RESP: lungs clear to auscultation - no  rales, rhonchi or wheezes  CV: regular rates and rhythm, normal S1 S2, no murmur noted  SKIN: no suspicious lesions or rashes

## 2024-01-14 ENCOUNTER — HEALTH MAINTENANCE LETTER (OUTPATIENT)
Age: 74
End: 2024-01-14

## 2024-06-02 ENCOUNTER — HEALTH MAINTENANCE LETTER (OUTPATIENT)
Age: 74
End: 2024-06-02

## 2024-08-11 ENCOUNTER — HEALTH MAINTENANCE LETTER (OUTPATIENT)
Age: 74
End: 2024-08-11

## 2025-02-23 ENCOUNTER — HEALTH MAINTENANCE LETTER (OUTPATIENT)
Age: 75
End: 2025-02-23

## 2025-06-14 ENCOUNTER — HEALTH MAINTENANCE LETTER (OUTPATIENT)
Age: 75
End: 2025-06-14

## 2025-08-27 ENCOUNTER — ANCILLARY PROCEDURE (OUTPATIENT)
Dept: GENERAL RADIOLOGY | Facility: CLINIC | Age: 75
End: 2025-08-27
Attending: PHYSICIAN ASSISTANT
Payer: MEDICARE

## 2025-08-27 ENCOUNTER — OFFICE VISIT (OUTPATIENT)
Dept: URGENT CARE | Facility: URGENT CARE | Age: 75
End: 2025-08-27
Payer: MEDICARE

## 2025-08-27 VITALS
DIASTOLIC BLOOD PRESSURE: 72 MMHG | SYSTOLIC BLOOD PRESSURE: 138 MMHG | TEMPERATURE: 97.5 F | WEIGHT: 209 LBS | RESPIRATION RATE: 18 BRPM | HEIGHT: 72 IN | BODY MASS INDEX: 28.31 KG/M2 | HEART RATE: 93 BPM | OXYGEN SATURATION: 95 %

## 2025-08-27 DIAGNOSIS — R05.2 SUBACUTE COUGH: ICD-10-CM

## 2025-08-27 DIAGNOSIS — J01.00 ACUTE NON-RECURRENT MAXILLARY SINUSITIS: Primary | ICD-10-CM

## 2025-08-27 PROCEDURE — 1125F AMNT PAIN NOTED PAIN PRSNT: CPT | Performed by: PHYSICIAN ASSISTANT

## 2025-08-27 PROCEDURE — 3078F DIAST BP <80 MM HG: CPT | Performed by: PHYSICIAN ASSISTANT

## 2025-08-27 PROCEDURE — 3075F SYST BP GE 130 - 139MM HG: CPT | Performed by: PHYSICIAN ASSISTANT

## 2025-08-27 PROCEDURE — 71046 X-RAY EXAM CHEST 2 VIEWS: CPT | Mod: TC | Performed by: RADIOLOGY

## 2025-08-27 PROCEDURE — 99214 OFFICE O/P EST MOD 30 MIN: CPT | Performed by: PHYSICIAN ASSISTANT

## 2025-08-27 RX ORDER — DOXYCYCLINE 100 MG/1
100 CAPSULE ORAL 2 TIMES DAILY
Qty: 14 CAPSULE | Refills: 0 | Status: SHIPPED | OUTPATIENT
Start: 2025-08-27 | End: 2025-09-03

## 2025-08-27 ASSESSMENT — PAIN SCALES - GENERAL: PAINLEVEL_OUTOF10: MILD PAIN (2)
